# Patient Record
Sex: FEMALE | Race: WHITE | NOT HISPANIC OR LATINO | Employment: OTHER | URBAN - METROPOLITAN AREA
[De-identification: names, ages, dates, MRNs, and addresses within clinical notes are randomized per-mention and may not be internally consistent; named-entity substitution may affect disease eponyms.]

---

## 2023-05-26 ENCOUNTER — APPOINTMENT (OUTPATIENT)
Dept: PRIMARY CARE | Facility: CLINIC | Age: 73
End: 2023-05-26
Payer: MEDICARE

## 2023-06-12 ENCOUNTER — LAB (OUTPATIENT)
Dept: LAB | Facility: LAB | Age: 73
End: 2023-06-12
Payer: MEDICARE

## 2023-06-12 ENCOUNTER — OFFICE VISIT (OUTPATIENT)
Dept: PRIMARY CARE | Facility: CLINIC | Age: 73
End: 2023-06-12
Payer: MEDICARE

## 2023-06-12 VITALS
RESPIRATION RATE: 18 BRPM | TEMPERATURE: 97.6 F | DIASTOLIC BLOOD PRESSURE: 84 MMHG | BODY MASS INDEX: 29.18 KG/M2 | WEIGHT: 170 LBS | HEART RATE: 76 BPM | OXYGEN SATURATION: 97 % | SYSTOLIC BLOOD PRESSURE: 128 MMHG

## 2023-06-12 DIAGNOSIS — R19.7 DIARRHEA, UNSPECIFIED TYPE: ICD-10-CM

## 2023-06-12 DIAGNOSIS — R19.7 DIARRHEA, UNSPECIFIED TYPE: Primary | ICD-10-CM

## 2023-06-12 PROBLEM — H26.9 CORTICAL CATARACT OF BOTH EYES: Status: ACTIVE | Noted: 2019-11-06

## 2023-06-12 LAB
ALANINE AMINOTRANSFERASE (SGPT) (U/L) IN SER/PLAS: 13 U/L (ref 7–45)
ALBUMIN (G/DL) IN SER/PLAS: 4 G/DL (ref 3.4–5)
ALKALINE PHOSPHATASE (U/L) IN SER/PLAS: 87 U/L (ref 33–136)
ANION GAP IN SER/PLAS: 10 MMOL/L (ref 10–20)
ASPARTATE AMINOTRANSFERASE (SGOT) (U/L) IN SER/PLAS: 15 U/L (ref 9–39)
BILIRUBIN TOTAL (MG/DL) IN SER/PLAS: 1.2 MG/DL (ref 0–1.2)
CALCIUM (MG/DL) IN SER/PLAS: 9.1 MG/DL (ref 8.6–10.3)
CARBON DIOXIDE, TOTAL (MMOL/L) IN SER/PLAS: 30 MMOL/L (ref 21–32)
CHLORIDE (MMOL/L) IN SER/PLAS: 101 MMOL/L (ref 98–107)
CREATININE (MG/DL) IN SER/PLAS: 0.92 MG/DL (ref 0.5–1.05)
ERYTHROCYTE DISTRIBUTION WIDTH (RATIO) BY AUTOMATED COUNT: 13.2 % (ref 11.5–14.5)
ERYTHROCYTE MEAN CORPUSCULAR HEMOGLOBIN CONCENTRATION (G/DL) BY AUTOMATED: 32.5 G/DL (ref 32–36)
ERYTHROCYTE MEAN CORPUSCULAR VOLUME (FL) BY AUTOMATED COUNT: 88 FL (ref 80–100)
ERYTHROCYTES (10*6/UL) IN BLOOD BY AUTOMATED COUNT: 4.79 X10E12/L (ref 4–5.2)
GFR FEMALE: 65 ML/MIN/1.73M2
GLUCOSE (MG/DL) IN SER/PLAS: 116 MG/DL (ref 74–99)
HEMATOCRIT (%) IN BLOOD BY AUTOMATED COUNT: 42.2 % (ref 36–46)
HEMOGLOBIN (G/DL) IN BLOOD: 13.7 G/DL (ref 12–16)
LEUKOCYTES (10*3/UL) IN BLOOD BY AUTOMATED COUNT: 13.4 X10E9/L (ref 4.4–11.3)
PLATELETS (10*3/UL) IN BLOOD AUTOMATED COUNT: 206 X10E9/L (ref 150–450)
POTASSIUM (MMOL/L) IN SER/PLAS: 3.8 MMOL/L (ref 3.5–5.3)
PROTEIN TOTAL: 8.1 G/DL (ref 6.4–8.2)
SODIUM (MMOL/L) IN SER/PLAS: 137 MMOL/L (ref 136–145)
UREA NITROGEN (MG/DL) IN SER/PLAS: 18 MG/DL (ref 6–23)

## 2023-06-12 PROCEDURE — 87506 IADNA-DNA/RNA PROBE TQ 6-11: CPT

## 2023-06-12 PROCEDURE — 1036F TOBACCO NON-USER: CPT | Performed by: NURSE PRACTITIONER

## 2023-06-12 PROCEDURE — 1160F RVW MEDS BY RX/DR IN RCRD: CPT | Performed by: NURSE PRACTITIONER

## 2023-06-12 PROCEDURE — 36415 COLL VENOUS BLD VENIPUNCTURE: CPT

## 2023-06-12 PROCEDURE — 85027 COMPLETE CBC AUTOMATED: CPT

## 2023-06-12 PROCEDURE — 1159F MED LIST DOCD IN RCRD: CPT | Performed by: NURSE PRACTITIONER

## 2023-06-12 PROCEDURE — 80053 COMPREHEN METABOLIC PANEL: CPT

## 2023-06-12 PROCEDURE — 99213 OFFICE O/P EST LOW 20 MIN: CPT | Performed by: NURSE PRACTITIONER

## 2023-06-12 PROCEDURE — 1157F ADVNC CARE PLAN IN RCRD: CPT | Performed by: NURSE PRACTITIONER

## 2023-06-12 RX ORDER — AMLODIPINE BESYLATE 2.5 MG/1
2.5 TABLET ORAL
COMMUNITY
End: 2024-03-04 | Stop reason: SDUPTHER

## 2023-06-12 RX ORDER — CARVEDILOL 25 MG/1
25 TABLET ORAL
COMMUNITY
End: 2024-03-04 | Stop reason: SDUPTHER

## 2023-06-12 RX ORDER — ESOMEPRAZOLE MAGNESIUM 40 MG/1
CAPSULE, DELAYED RELEASE ORAL
COMMUNITY
End: 2023-10-09 | Stop reason: SDUPTHER

## 2023-06-12 RX ORDER — ERGOCALCIFEROL 1.25 MG/1
1 CAPSULE ORAL
COMMUNITY
Start: 2022-09-02 | End: 2023-09-07 | Stop reason: ALTCHOICE

## 2023-06-12 RX ORDER — LOSARTAN POTASSIUM 100 MG/1
1 TABLET ORAL DAILY
COMMUNITY
End: 2024-03-04 | Stop reason: SDUPTHER

## 2023-06-12 RX ORDER — CALCIUM CARBONATE 500(1250)
2 TABLET ORAL
COMMUNITY
End: 2023-09-07 | Stop reason: ALTCHOICE

## 2023-06-12 RX ORDER — OLOPATADINE HYDROCHLORIDE 1 MG/ML
SOLUTION/ DROPS OPHTHALMIC
COMMUNITY

## 2023-06-12 RX ORDER — OXYBUTYNIN CHLORIDE 10 MG/1
1 TABLET, EXTENDED RELEASE ORAL DAILY
COMMUNITY
Start: 2022-05-24 | End: 2024-03-04 | Stop reason: SDUPTHER

## 2023-06-12 RX ORDER — HYDROCHLOROTHIAZIDE 25 MG/1
25 TABLET ORAL DAILY
COMMUNITY
End: 2023-09-07 | Stop reason: ALTCHOICE

## 2023-06-12 ASSESSMENT — ENCOUNTER SYMPTOMS
DIARRHEA: 1
HEADACHES: 0
COUGH: 0
SLEEP DISTURBANCE: 1
CHILLS: 1
FLANK PAIN: 0
DIFFICULTY URINATING: 0
APPETITE CHANGE: 0
CONSTIPATION: 0
DYSURIA: 0
FATIGUE: 0
ACTIVITY CHANGE: 0
VOMITING: 1
NAUSEA: 0
BLOOD IN STOOL: 0
ABDOMINAL PAIN: 1
FEVER: 0

## 2023-06-12 NOTE — ASSESSMENT & PLAN NOTE
VS stable   Exam unremarkable  Stool studies, blood work and ultrasound ordered; Will follow up on results  Hydration encouraged along with BRAT/ low residue diet   If all testing is negative, will consider referral to GI  ER for any worsening of symptoms, pain or uncontrolled fevers

## 2023-06-12 NOTE — PROGRESS NOTES
Subjective   Patient ID: Teri Sandhu is a 73 y.o. female who presents for Diarrhea.    Here in December for Diarrhea; came and went  Immodium would help    Liquid stools x2 weeks  Vomited last night x1  Taking immodium non-stop; works maybe 6 hours  Light colored stool; looked like vanilla ice cream  R lower quadrant pain; not severe and intermittent  Chills; no fever; Just had vaccine for shingles  Had a BM 3x between 6a-7:30a  Eating and drinking ok. Eating exacerbates diarrhea  No new foods  No recent changes/ addition of medication  Denies any blood or mucous in stool  No recent antibiotic use  Weight stable  No recent travel              Diarrhea   This is a recurrent problem. The current episode started in the past 7 days. The problem has been unchanged. Associated symptoms include abdominal pain, chills and vomiting. Pertinent negatives include no coughing, fever or headaches. She has tried anti-motility drug for the symptoms.        Review of Systems   Constitutional:  Positive for chills. Negative for activity change, appetite change, fatigue and fever.   HENT:  Negative for congestion.    Respiratory:  Negative for cough.    Gastrointestinal:  Positive for abdominal pain, diarrhea and vomiting. Negative for blood in stool, constipation and nausea.   Genitourinary:  Negative for difficulty urinating, dysuria, flank pain and urgency.   Neurological:  Negative for headaches.   Psychiatric/Behavioral:  Positive for sleep disturbance.        Objective   /84   Pulse 76   Temp 36.4 °C (97.6 °F) (Temporal)   Resp 18   Wt 77.1 kg (170 lb)   SpO2 97%   BMI 29.18 kg/m²     Physical Exam  Vitals reviewed.   Constitutional:       Appearance: Normal appearance.   HENT:      Head: Normocephalic.      Nose: Nose normal.   Eyes:      Extraocular Movements: Extraocular movements intact.      Pupils: Pupils are equal, round, and reactive to light.   Cardiovascular:      Rate and Rhythm: Normal rate and  regular rhythm.      Pulses: Normal pulses.      Heart sounds: Normal heart sounds.   Pulmonary:      Effort: Pulmonary effort is normal.      Breath sounds: Normal breath sounds.   Abdominal:      General: Abdomen is flat. Bowel sounds are normal.      Palpations: Abdomen is soft.      Tenderness: There is no abdominal tenderness. There is no right CVA tenderness, left CVA tenderness or guarding.   Musculoskeletal:      Cervical back: Normal range of motion and neck supple.   Skin:     General: Skin is warm and dry.   Neurological:      General: No focal deficit present.      Mental Status: She is alert and oriented to person, place, and time.   Psychiatric:         Mood and Affect: Mood normal.         Behavior: Behavior normal.         Assessment/Plan   Problem List Items Addressed This Visit          Other    Diarrhea - Primary     VS stable   Exam unremarkable  Stool studies, blood work and ultrasound ordered; Will follow up on results  Hydration encouraged along with BRAT/ low residue diet   If all testing is negative, will consider referral to GI  ER for any worsening of symptoms, pain or uncontrolled fevers         Relevant Orders    Comprehensive Metabolic Panel    CBC    Stool Pathogen Panel, PCR    US abdomen    Ova/Para + Giardia/Cryptosporidium Antigen

## 2023-06-14 ENCOUNTER — APPOINTMENT (OUTPATIENT)
Dept: LAB | Facility: LAB | Age: 73
End: 2023-06-14
Payer: MEDICARE

## 2023-06-14 PROCEDURE — 87328 CRYPTOSPORIDIUM AG IA: CPT

## 2023-06-14 PROCEDURE — 87329 GIARDIA AG IA: CPT

## 2023-06-14 PROCEDURE — 87177 OVA AND PARASITES SMEARS: CPT

## 2023-06-15 LAB
CAMPYLOBACTER GP: NOT DETECTED
NOROVIRUS GI/GII: NOT DETECTED
ROTAVIRUS A: NOT DETECTED
SALMONELLA SP.: NOT DETECTED
SHIGA TOXIN 1: NOT DETECTED
SHIGA TOXIN 2: NOT DETECTED
SHIGELLA SP.: NOT DETECTED
VIBRIO GRP.: NOT DETECTED
YERSINIA ENTEROCOLITICA: NOT DETECTED

## 2023-06-19 ENCOUNTER — PATIENT MESSAGE (OUTPATIENT)
Dept: PRIMARY CARE | Facility: CLINIC | Age: 73
End: 2023-06-19
Payer: MEDICARE

## 2023-06-19 DIAGNOSIS — R19.7 DIARRHEA, UNSPECIFIED TYPE: Primary | ICD-10-CM

## 2023-06-19 LAB
CRYPTOSPORIDIUM ANTIGEN BY EIA: NEGATIVE
CRYPTOSPORIDIUM ANTIGEN-DATA CONVERSION: NEGATIVE
GIARDIA LAMBLIA AG-DATA CONVERSION: NEGATIVE
GIARDIA LAMBLIA AG: NEGATIVE
OVA + PARASITE EXAM: NEGATIVE

## 2023-08-31 PROBLEM — E87.6 DIURETIC-INDUCED HYPOKALEMIA: Status: ACTIVE | Noted: 2023-08-31

## 2023-08-31 PROBLEM — K76.0 FATTY LIVER: Status: ACTIVE | Noted: 2023-08-31

## 2023-08-31 PROBLEM — H25.13 NUCLEAR SCLEROSIS OF BOTH EYES: Status: ACTIVE | Noted: 2019-11-06

## 2023-08-31 PROBLEM — H00.012 HORDEOLUM EXTERNUM OF RIGHT LOWER EYELID: Status: ACTIVE | Noted: 2020-12-04

## 2023-08-31 PROBLEM — K22.2 GERD WITH STRICTURE: Status: ACTIVE | Noted: 2023-08-31

## 2023-08-31 PROBLEM — J30.2 SEASONAL ALLERGIC RHINITIS: Status: ACTIVE | Noted: 2023-08-31

## 2023-08-31 PROBLEM — H52.13 MYOPIA, BILATERAL: Status: ACTIVE | Noted: 2019-11-06

## 2023-08-31 PROBLEM — T50.2X5A DIURETIC-INDUCED HYPOKALEMIA: Status: ACTIVE | Noted: 2023-08-31

## 2023-08-31 PROBLEM — R32 URINARY INCONTINENCE: Status: ACTIVE | Noted: 2023-08-31

## 2023-08-31 PROBLEM — I49.3 PVC (PREMATURE VENTRICULAR CONTRACTION): Status: ACTIVE | Noted: 2023-08-31

## 2023-08-31 PROBLEM — R73.9 ELEVATED BLOOD SUGAR: Status: ACTIVE | Noted: 2023-08-31

## 2023-08-31 PROBLEM — R73.03 PREDIABETES: Status: ACTIVE | Noted: 2023-08-31

## 2023-08-31 PROBLEM — M85.80 OSTEOPENIA: Status: ACTIVE | Noted: 2023-08-31

## 2023-08-31 PROBLEM — H25.013 CORTICAL AGE-RELATED CATARACT, BILATERAL: Status: ACTIVE | Noted: 2018-08-27

## 2023-08-31 PROBLEM — H10.9 BACTERIAL CONJUNCTIVITIS: Status: ACTIVE | Noted: 2017-04-28

## 2023-08-31 PROBLEM — I10 HYPERTENSION, BENIGN: Status: ACTIVE | Noted: 2023-08-31

## 2023-08-31 PROBLEM — E55.9 VITAMIN D DEFICIENCY: Status: ACTIVE | Noted: 2023-08-31

## 2023-08-31 PROBLEM — K21.9 GERD WITH STRICTURE: Status: ACTIVE | Noted: 2023-08-31

## 2023-08-31 PROBLEM — M25.651 STIFFNESS OF RIGHT HIP JOINT: Status: ACTIVE | Noted: 2023-08-31

## 2023-08-31 RX ORDER — PROMETHAZINE HYDROCHLORIDE 25 MG/1
1 TABLET ORAL
COMMUNITY
Start: 2022-07-22 | End: 2023-09-07 | Stop reason: ALTCHOICE

## 2023-08-31 RX ORDER — SOLIFENACIN SUCCINATE 5 MG/1
TABLET, FILM COATED ORAL
COMMUNITY
End: 2024-03-04 | Stop reason: SDUPTHER

## 2023-08-31 RX ORDER — MISOPROSTOL 100 UG/1
1 TABLET ORAL
COMMUNITY
Start: 2022-09-28 | End: 2023-09-07 | Stop reason: ALTCHOICE

## 2023-08-31 RX ORDER — SOLIFENACIN SUCCINATE 10 MG/1
TABLET, FILM COATED ORAL
COMMUNITY
Start: 2020-08-15 | End: 2023-09-07 | Stop reason: ALTCHOICE

## 2023-08-31 RX ORDER — POTASSIUM CHLORIDE 750 MG/1
TABLET, FILM COATED, EXTENDED RELEASE ORAL
COMMUNITY
Start: 2017-05-14 | End: 2023-09-07 | Stop reason: ALTCHOICE

## 2023-08-31 RX ORDER — LORAZEPAM 0.5 MG/1
1 TABLET ORAL
COMMUNITY
Start: 2022-09-28 | End: 2023-09-07 | Stop reason: ALTCHOICE

## 2023-08-31 RX ORDER — FLUTICASONE PROPIONATE 50 MCG
SPRAY, SUSPENSION (ML) NASAL
COMMUNITY
End: 2023-09-07 | Stop reason: ALTCHOICE

## 2023-08-31 RX ORDER — DIPHENHYDRAMINE HCL 25 MG
TABLET ORAL
COMMUNITY
End: 2023-10-09 | Stop reason: ALTCHOICE

## 2023-08-31 RX ORDER — ESOMEPRAZOLE MAGNESIUM 40 MG/1
1 CAPSULE, DELAYED RELEASE ORAL DAILY PRN
COMMUNITY
End: 2024-03-04 | Stop reason: SDUPTHER

## 2023-08-31 RX ORDER — PHENYLPROPANOLAMINE/CLEMASTINE 75-1.34MG
TABLET, EXTENDED RELEASE ORAL
COMMUNITY

## 2023-08-31 RX ORDER — LISINOPRIL 10 MG/1
TABLET ORAL
COMMUNITY
End: 2023-09-07 | Stop reason: ALTCHOICE

## 2023-08-31 RX ORDER — HYDROCHLOROTHIAZIDE 12.5 MG/1
1 TABLET ORAL DAILY
COMMUNITY
Start: 2020-11-01 | End: 2024-03-04 | Stop reason: SDUPTHER

## 2023-09-07 ENCOUNTER — OFFICE VISIT (OUTPATIENT)
Dept: PRIMARY CARE | Facility: CLINIC | Age: 73
End: 2023-09-07
Payer: MEDICARE

## 2023-09-07 VITALS
TEMPERATURE: 98.1 F | HEART RATE: 75 BPM | DIASTOLIC BLOOD PRESSURE: 67 MMHG | HEIGHT: 64 IN | BODY MASS INDEX: 28.94 KG/M2 | SYSTOLIC BLOOD PRESSURE: 123 MMHG | WEIGHT: 169.5 LBS

## 2023-09-07 DIAGNOSIS — Z00.00 ROUTINE GENERAL MEDICAL EXAMINATION AT HEALTH CARE FACILITY: Primary | ICD-10-CM

## 2023-09-07 DIAGNOSIS — R73.9 ELEVATED BLOOD SUGAR: ICD-10-CM

## 2023-09-07 DIAGNOSIS — K76.0 FATTY LIVER: ICD-10-CM

## 2023-09-07 DIAGNOSIS — I10 HYPERTENSION, BENIGN: ICD-10-CM

## 2023-09-07 PROCEDURE — 1160F RVW MEDS BY RX/DR IN RCRD: CPT | Performed by: NURSE PRACTITIONER

## 2023-09-07 PROCEDURE — 99213 OFFICE O/P EST LOW 20 MIN: CPT | Performed by: NURSE PRACTITIONER

## 2023-09-07 PROCEDURE — 1170F FXNL STATUS ASSESSED: CPT | Performed by: NURSE PRACTITIONER

## 2023-09-07 PROCEDURE — 1159F MED LIST DOCD IN RCRD: CPT | Performed by: NURSE PRACTITIONER

## 2023-09-07 PROCEDURE — 1157F ADVNC CARE PLAN IN RCRD: CPT | Performed by: NURSE PRACTITIONER

## 2023-09-07 PROCEDURE — 3078F DIAST BP <80 MM HG: CPT | Performed by: NURSE PRACTITIONER

## 2023-09-07 PROCEDURE — 1036F TOBACCO NON-USER: CPT | Performed by: NURSE PRACTITIONER

## 2023-09-07 PROCEDURE — 3074F SYST BP LT 130 MM HG: CPT | Performed by: NURSE PRACTITIONER

## 2023-09-07 ASSESSMENT — ENCOUNTER SYMPTOMS
SHORTNESS OF BREATH: 0
DIARRHEA: 0
WEAKNESS: 0
VOMITING: 0
FEVER: 0
AGITATION: 0
CONSTIPATION: 0
NAUSEA: 0
SLEEP DISTURBANCE: 0
COUGH: 0
NERVOUS/ANXIOUS: 0
FATIGUE: 0

## 2023-09-07 ASSESSMENT — PATIENT HEALTH QUESTIONNAIRE - PHQ9
2. FEELING DOWN, DEPRESSED OR HOPELESS: NOT AT ALL
SUM OF ALL RESPONSES TO PHQ9 QUESTIONS 1 AND 2: 0
1. LITTLE INTEREST OR PLEASURE IN DOING THINGS: NOT AT ALL

## 2023-09-07 ASSESSMENT — ACTIVITIES OF DAILY LIVING (ADL)
DRESSING: INDEPENDENT
MANAGING_FINANCES: INDEPENDENT
DOING_HOUSEWORK: INDEPENDENT
TAKING_MEDICATION: INDEPENDENT
GROCERY_SHOPPING: INDEPENDENT
BATHING: INDEPENDENT

## 2023-09-07 NOTE — PROGRESS NOTES
"Subjective   Reason for Visit: Teri Sandhu is an 73 y.o. female here for a Medicare Wellness visit.          Reviewed all medications by prescribing practitioner or clinical pharmacist (such as prescriptions, OTCs, herbal therapies and supplements) and documented in the medical record.    She is feeling well and has no current complaints, she has a follow up with GI and had a recent scan of her abdomen done.         Patient Care Team:  LIZABETH Ramirez as PCP - General (Internal Medicine)  LIZABETH Urias as PCP - [facility] Attributed Provider     Review of Systems   Constitutional:  Negative for fatigue and fever.   Respiratory:  Negative for cough and shortness of breath.    Cardiovascular:  Negative for chest pain and leg swelling.   Gastrointestinal:  Negative for constipation, diarrhea, nausea and vomiting.   Skin:  Negative for pallor and rash.   Neurological:  Negative for weakness.   Psychiatric/Behavioral:  Negative for agitation, behavioral problems and sleep disturbance. The patient is not nervous/anxious.        Objective   Vitals:  /67   Pulse 75   Temp 36.7 °C (98.1 °F)   Ht 1.626 m (5' 4\")   Wt 76.9 kg (169 lb 8 oz)   BMI 29.09 kg/m²       Physical Exam  Vitals and nursing note reviewed.   Constitutional:       General: She is not in acute distress.  HENT:      Head: Normocephalic and atraumatic.   Eyes:      Pupils: Pupils are equal, round, and reactive to light.   Cardiovascular:      Rate and Rhythm: Normal rate and regular rhythm.   Pulmonary:      Effort: Pulmonary effort is normal.      Breath sounds: Normal breath sounds.   Abdominal:      General: Bowel sounds are normal.      Palpations: Abdomen is soft.   Skin:     General: Skin is warm and dry.   Neurological:      Mental Status: She is alert.   Psychiatric:         Mood and Affect: Mood normal.         Assessment/Plan   Problem List Items Addressed This Visit       Elevated blood sugar    Fatty liver    " Hypertension, benign     Other Visit Diagnoses       Routine general medical examination at health care facility    -  Primary    Relevant Orders    Comprehensive Metabolic Panel    Hemoglobin A1C    TSH with reflex to Free T4 if abnormal    Lipid Panel         She will let us know what medications she needs refilled  She will defer mammogram until after first of the year

## 2023-09-15 ENCOUNTER — LAB (OUTPATIENT)
Dept: LAB | Facility: LAB | Age: 73
End: 2023-09-15
Payer: MEDICARE

## 2023-09-15 DIAGNOSIS — Z00.00 ROUTINE GENERAL MEDICAL EXAMINATION AT HEALTH CARE FACILITY: ICD-10-CM

## 2023-09-15 LAB
ALANINE AMINOTRANSFERASE (SGPT) (U/L) IN SER/PLAS: 14 U/L (ref 7–45)
ALBUMIN (G/DL) IN SER/PLAS: 3.9 G/DL (ref 3.4–5)
ALKALINE PHOSPHATASE (U/L) IN SER/PLAS: 75 U/L (ref 33–136)
ANION GAP IN SER/PLAS: 11 MMOL/L (ref 10–20)
ASPARTATE AMINOTRANSFERASE (SGOT) (U/L) IN SER/PLAS: 15 U/L (ref 9–39)
BILIRUBIN TOTAL (MG/DL) IN SER/PLAS: 0.8 MG/DL (ref 0–1.2)
CALCIUM (MG/DL) IN SER/PLAS: 9 MG/DL (ref 8.6–10.3)
CARBON DIOXIDE, TOTAL (MMOL/L) IN SER/PLAS: 28 MMOL/L (ref 21–32)
CHLORIDE (MMOL/L) IN SER/PLAS: 104 MMOL/L (ref 98–107)
CHOLESTEROL (MG/DL) IN SER/PLAS: 157 MG/DL (ref 0–199)
CHOLESTEROL IN HDL (MG/DL) IN SER/PLAS: 49.1 MG/DL
CHOLESTEROL/HDL RATIO: 3.2
CREATININE (MG/DL) IN SER/PLAS: 0.84 MG/DL (ref 0.5–1.05)
ESTIMATED AVERAGE GLUCOSE FOR HBA1C: 117 MG/DL
GFR FEMALE: 72 ML/MIN/1.73M2
GLUCOSE (MG/DL) IN SER/PLAS: 109 MG/DL (ref 74–99)
HEMOGLOBIN A1C/HEMOGLOBIN TOTAL IN BLOOD: 5.7 %
LDL: 80 MG/DL (ref 0–99)
POTASSIUM (MMOL/L) IN SER/PLAS: 3.5 MMOL/L (ref 3.5–5.3)
PROTEIN TOTAL: 8 G/DL (ref 6.4–8.2)
SODIUM (MMOL/L) IN SER/PLAS: 139 MMOL/L (ref 136–145)
THYROTROPIN (MIU/L) IN SER/PLAS BY DETECTION LIMIT <= 0.05 MIU/L: 1.3 MIU/L (ref 0.44–3.98)
TRIGLYCERIDE (MG/DL) IN SER/PLAS: 140 MG/DL (ref 0–149)
UREA NITROGEN (MG/DL) IN SER/PLAS: 26 MG/DL (ref 6–23)
VLDL: 28 MG/DL (ref 0–40)

## 2023-09-15 PROCEDURE — 80061 LIPID PANEL: CPT

## 2023-09-15 PROCEDURE — 83036 HEMOGLOBIN GLYCOSYLATED A1C: CPT

## 2023-09-15 PROCEDURE — 80053 COMPREHEN METABOLIC PANEL: CPT

## 2023-09-15 PROCEDURE — 36415 COLL VENOUS BLD VENIPUNCTURE: CPT

## 2023-09-15 PROCEDURE — 84443 ASSAY THYROID STIM HORMONE: CPT

## 2023-10-09 ENCOUNTER — OFFICE VISIT (OUTPATIENT)
Dept: GASTROENTEROLOGY | Facility: CLINIC | Age: 73
End: 2023-10-09
Payer: MEDICARE

## 2023-10-09 VITALS
DIASTOLIC BLOOD PRESSURE: 81 MMHG | SYSTOLIC BLOOD PRESSURE: 148 MMHG | HEIGHT: 64 IN | WEIGHT: 169 LBS | BODY MASS INDEX: 28.85 KG/M2 | TEMPERATURE: 97.4 F | HEART RATE: 71 BPM

## 2023-10-09 DIAGNOSIS — K76.0 FATTY LIVER: Primary | ICD-10-CM

## 2023-10-09 PROCEDURE — 99212 OFFICE O/P EST SF 10 MIN: CPT | Performed by: NURSE PRACTITIONER

## 2023-10-09 PROCEDURE — 99212 OFFICE O/P EST SF 10 MIN: CPT | Mod: PO | Performed by: NURSE PRACTITIONER

## 2023-10-09 PROCEDURE — 3079F DIAST BP 80-89 MM HG: CPT | Performed by: NURSE PRACTITIONER

## 2023-10-09 PROCEDURE — 1159F MED LIST DOCD IN RCRD: CPT | Performed by: NURSE PRACTITIONER

## 2023-10-09 PROCEDURE — 1160F RVW MEDS BY RX/DR IN RCRD: CPT | Performed by: NURSE PRACTITIONER

## 2023-10-09 PROCEDURE — 3077F SYST BP >= 140 MM HG: CPT | Performed by: NURSE PRACTITIONER

## 2023-10-09 PROCEDURE — 1036F TOBACCO NON-USER: CPT | Performed by: NURSE PRACTITIONER

## 2023-10-09 ASSESSMENT — ENCOUNTER SYMPTOMS
APNEA: 0
NEUROLOGICAL NEGATIVE: 1
FATIGUE: 0
WHEEZING: 0
FEVER: 0
STRIDOR: 0
ALLERGIC/IMMUNOLOGIC NEGATIVE: 1
CHEST TIGHTNESS: 0
PSYCHIATRIC NEGATIVE: 1
SHORTNESS OF BREATH: 0
CHILLS: 0
RESPIRATORY NEGATIVE: 1
DIAPHORESIS: 0
ENDOCRINE NEGATIVE: 1
ROS GI COMMENTS: SEE HPI
MUSCULOSKELETAL NEGATIVE: 1
COUGH: 0
CARDIOVASCULAR NEGATIVE: 1
EYES NEGATIVE: 1
HEMATOLOGIC/LYMPHATIC NEGATIVE: 1
DIFFICULTY URINATING: 0

## 2023-10-09 NOTE — PROGRESS NOTES
History of Present Illness  Teri Sandhu is a 73 year old female with a PMH of GERD, PVCs      CT negative.e--fatty liver, drinks socially, no scleral icterus, abdominal swelling or leg swelling (mild at times), stool culture and O&P negative. No longer having diarrhea, was having diarrhea a few times/week.   She stopped dairy and no longer having diarrhea.    She denies nocturnal BMs, fevers, weight loss, rectal bleeding, nocturnal BMs.    She is a wine drinker socially    Colonoscopy 2020- TA-repeat in 5 years    Family Hx: She denies a family hx of CRC, GI cancers         Review of Systems   Constitutional:  Negative for chills, diaphoresis, fatigue and fever.   HENT: Negative.     Eyes: Negative.    Respiratory: Negative.  Negative for apnea, cough, chest tightness, shortness of breath, wheezing and stridor.    Cardiovascular: Negative.    Gastrointestinal:         See HPI    Endocrine: Negative.    Genitourinary: Negative.  Negative for difficulty urinating.   Musculoskeletal: Negative.    Skin: Negative.    Allergic/Immunologic: Negative.    Neurological: Negative.    Hematological: Negative.    Psychiatric/Behavioral: Negative.          Physical Exam  Constitutional:       Appearance: Normal appearance. She is normal weight.   HENT:      Nose: Nose normal.   Eyes:      General: Lids are normal.   Cardiovascular:      Rate and Rhythm: Normal rate and regular rhythm.      Heart sounds: Normal heart sounds.   Pulmonary:      Effort: Pulmonary effort is normal.      Breath sounds: Normal breath sounds.   Abdominal:      General: Bowel sounds are normal.   Musculoskeletal:         General: Normal range of motion.   Skin:     General: Skin is warm and dry.   Neurological:      Mental Status: She is alert and oriented to person, place, and time.   Psychiatric:         Mood and Affect: Mood normal.              This is a 73 year old female with a PMH of GERD, PVCs who presents today for chronic diarrhea.  Intermittent her entire life and more recently was more frequent. SHe was using Imodium and decided to try a lactose free diet and her diarrhea had resolved. She did have a CT which showed fatty liver, otherwise unremarkable for GI etiology. We reviewed fibroscan and discussed alcohol consumption, diet, exercise and weight loss.

## 2023-10-11 ENCOUNTER — HOSPITAL ENCOUNTER (EMERGENCY)
Facility: HOSPITAL | Age: 73
Discharge: HOME | End: 2023-10-11
Attending: EMERGENCY MEDICINE
Payer: MEDICARE

## 2023-10-11 ENCOUNTER — APPOINTMENT (OUTPATIENT)
Dept: RADIOLOGY | Facility: HOSPITAL | Age: 73
End: 2023-10-11
Payer: MEDICARE

## 2023-10-11 VITALS
WEIGHT: 169 LBS | BODY MASS INDEX: 27.16 KG/M2 | RESPIRATION RATE: 18 BRPM | SYSTOLIC BLOOD PRESSURE: 154 MMHG | HEIGHT: 66 IN | OXYGEN SATURATION: 97 % | DIASTOLIC BLOOD PRESSURE: 80 MMHG | TEMPERATURE: 96.8 F | HEART RATE: 93 BPM

## 2023-10-11 DIAGNOSIS — S42.91XA CLOSED FRACTURE OF RIGHT SHOULDER, INITIAL ENCOUNTER: Primary | ICD-10-CM

## 2023-10-11 PROCEDURE — 99285 EMERGENCY DEPT VISIT HI MDM: CPT | Performed by: EMERGENCY MEDICINE

## 2023-10-11 PROCEDURE — 73030 X-RAY EXAM OF SHOULDER: CPT | Mod: RT,FY

## 2023-10-11 PROCEDURE — 72125 CT NECK SPINE W/O DYE: CPT | Performed by: RADIOLOGY

## 2023-10-11 PROCEDURE — 73030 X-RAY EXAM OF SHOULDER: CPT | Mod: RIGHT SIDE | Performed by: RADIOLOGY

## 2023-10-11 PROCEDURE — 70450 CT HEAD/BRAIN W/O DYE: CPT | Mod: MG

## 2023-10-11 PROCEDURE — 72125 CT NECK SPINE W/O DYE: CPT

## 2023-10-11 PROCEDURE — 70450 CT HEAD/BRAIN W/O DYE: CPT | Performed by: RADIOLOGY

## 2023-10-11 PROCEDURE — 99285 EMERGENCY DEPT VISIT HI MDM: CPT | Mod: 25

## 2023-10-11 RX ORDER — ACETAMINOPHEN 325 MG/1
650 TABLET ORAL ONCE
Status: COMPLETED | OUTPATIENT
Start: 2023-10-11 | End: 2023-10-11

## 2023-10-11 RX ORDER — HYDROCODONE BITARTRATE AND ACETAMINOPHEN 5; 325 MG/1; MG/1
1 TABLET ORAL EVERY 6 HOURS PRN
Qty: 12 TABLET | Refills: 0 | Status: SHIPPED | OUTPATIENT
Start: 2023-10-11

## 2023-10-11 RX ORDER — IBUPROFEN 600 MG/1
600 TABLET ORAL ONCE
Status: DISCONTINUED | OUTPATIENT
Start: 2023-10-11 | End: 2023-10-11

## 2023-10-11 RX ADMIN — ACETAMINOPHEN 650 MG: 325 TABLET ORAL at 18:06

## 2023-10-11 ASSESSMENT — PAIN DESCRIPTION - LOCATION: LOCATION: SHOULDER

## 2023-10-11 ASSESSMENT — PAIN DESCRIPTION - ONSET: ONSET: SUDDEN

## 2023-10-11 ASSESSMENT — PAIN SCALES - GENERAL
PAINLEVEL_OUTOF10: 0 - NO PAIN
PAINLEVEL_OUTOF10: 9
PAINLEVEL_OUTOF10: 9
PAINLEVEL_OUTOF10: 0 - NO PAIN

## 2023-10-11 ASSESSMENT — COLUMBIA-SUICIDE SEVERITY RATING SCALE - C-SSRS
2. HAVE YOU ACTUALLY HAD ANY THOUGHTS OF KILLING YOURSELF?: NO
6. HAVE YOU EVER DONE ANYTHING, STARTED TO DO ANYTHING, OR PREPARED TO DO ANYTHING TO END YOUR LIFE?: NO

## 2023-10-11 ASSESSMENT — LIFESTYLE VARIABLES
HAVE PEOPLE ANNOYED YOU BY CRITICIZING YOUR DRINKING: NO
REASON UNABLE TO ASSESS: YES
EVER HAD A DRINK FIRST THING IN THE MORNING TO STEADY YOUR NERVES TO GET RID OF A HANGOVER: NO
HAVE YOU EVER FELT YOU SHOULD CUT DOWN ON YOUR DRINKING: NO
EVER FELT BAD OR GUILTY ABOUT YOUR DRINKING: NO

## 2023-10-11 ASSESSMENT — PAIN DESCRIPTION - PROGRESSION: CLINICAL_PROGRESSION: NOT CHANGED

## 2023-10-11 ASSESSMENT — PAIN DESCRIPTION - ORIENTATION: ORIENTATION: RIGHT

## 2023-10-11 ASSESSMENT — PAIN DESCRIPTION - FREQUENCY: FREQUENCY: CONSTANT/CONTINUOUS

## 2023-10-11 ASSESSMENT — PAIN DESCRIPTION - PAIN TYPE: TYPE: ACUTE PAIN

## 2023-10-11 ASSESSMENT — PAIN DESCRIPTION - DESCRIPTORS: DESCRIPTORS: SHARP

## 2023-10-11 ASSESSMENT — PAIN - FUNCTIONAL ASSESSMENT: PAIN_FUNCTIONAL_ASSESSMENT: 0-10

## 2023-10-11 NOTE — ED TRIAGE NOTES
"Pt states,\" I fall in [employer] parking lot landed on right shoulder, now having sharp pain & limited movement\".  "

## 2023-10-11 NOTE — ED PROVIDER NOTES
"HPI   Chief Complaint   Patient presents with    Fall     Pt states,\" I fall in [employer] parking lot landed on right shoulder, now having sharp pain & limited movement\".       Patient is a 73-year-old female with hypertension, GERD who presents emergency after mechanical fall.  She was in the parking lot at [employer] when she fell.  She states that she tripped and did not have any symptoms such as chest pain, shortness of breath, dizziness prior to the fall.  She land on the right side, hurting her shoulder.  Denies pain and will also just the ribs, hip, leg, head, neck.  Denies hitting her head.  Does not have any nausea, vomiting, loss of consciousness.  She denies any numbness or weakness in her extremities or her right arm.  She has limited range of motion of her right arm.  That she drove home first to change before coming to the emergency department.  She is not on any blood thinners.                          Leland Coma Scale Score: 15                  Patient History   Past Medical History:   Diagnosis Date    Allergic     Arthritis     Cataract     Encounter for screening for malignant neoplasm of colon     Colon cancer screening    Encounter for screening for malignant neoplasm of vagina     Vaginal Pap smear    Hypertension     Personal history of other medical treatment 11/18/2019    History of mammogram    Personal history of other medical treatment     History of bone density study    Visual impairment      Past Surgical History:   Procedure Laterality Date    FRACTURE SURGERY      OTHER SURGICAL HISTORY  11/18/2019    Dilation and curettage    OTHER SURGICAL HISTORY  11/18/2019    Tonsillectomy with adenoidectomy    TONSILLECTOMY       Family History   Problem Relation Name Age of Onset    Hypertension Mother Jazmine Gabriel     Hyperlipidemia Mother Jazmine Gabriel     Other (cva) Mother Jazmine Gabriel     Dementia Mother Jazmine Gabriel     Arthritis Mother Jazmine Gabriel     Cancer Mother Jazmine Gabriel     " Stroke Mother Jazmine Gabriel     Diabetes Father Dorian Gabriel     Dementia Father Dorian Gabriel     Alzheimer's disease Father Dorian Gabriel     Arthritis Father Dorian Gabriel     Hearing loss Father Dorian Gabriel     Heart disease Father Dorian Gabriel     Cancer Sister Dorothy Wiley     Ovarian cancer Sister Dorothy Wiley      Social History     Tobacco Use    Smoking status: Former     Packs/day: 0.50     Years: 10.00     Additional pack years: 0.00     Total pack years: 5.00     Types: Cigarettes    Smokeless tobacco: Never   Substance Use Topics    Alcohol use: Yes     Alcohol/week: 4.0 standard drinks of alcohol     Types: 2 Glasses of wine, 2 Standard drinks or equivalent per week     Comment: Many weeks none, some weks more    Drug use: Never       Physical Exam   ED Triage Vitals [10/11/23 1523]   Temp Heart Rate Resp BP   36 °C (96.8 °F) 74 16 180/81      SpO2 Temp Source Heart Rate Source Patient Position   99 % Tympanic Monitor Sitting      BP Location FiO2 (%)     Left arm --       Physical Exam  Vitals and nursing note reviewed.   Constitutional:       General: She is not in acute distress.     Appearance: She is well-developed.   HENT:      Head: Normocephalic and atraumatic.      Right Ear: External ear normal.      Left Ear: External ear normal.      Nose: Nose normal.   Eyes:      General: No scleral icterus.     Conjunctiva/sclera: Conjunctivae normal.      Pupils: Pupils are equal, round, and reactive to light.   Cardiovascular:      Rate and Rhythm: Normal rate and regular rhythm.      Heart sounds: No murmur heard.  Pulmonary:      Effort: Pulmonary effort is normal. No respiratory distress.      Breath sounds: Normal breath sounds.   Abdominal:      Palpations: Abdomen is soft.      Tenderness: There is no abdominal tenderness. There is no guarding or rebound.   Musculoskeletal:         General: Tenderness, deformity and signs of injury present. No swelling.      Cervical back: Neck supple.       Comments: Tenderness of the right shoulder with limited range of mobility.  Shoulder appears to be anteriorly placed compared to the left.  No tenderness of the remainder of the arm, other extremities, back or neck.   Skin:     General: Skin is warm and dry.      Capillary Refill: Capillary refill takes less than 2 seconds.   Neurological:      General: No focal deficit present.      Mental Status: She is alert and oriented to person, place, and time.      Cranial Nerves: No cranial nerve deficit.      Sensory: No sensory deficit.      Motor: No weakness.   Psychiatric:         Mood and Affect: Mood normal.         ED Course & MDM   Diagnoses as of 10/12/23 0015   Closed fracture of right shoulder, initial encounter       Medical Decision Making  Patient is a 73-year-old female who presents to the emergency department after mechanical fall landing on her right shoulder.  She is not on any blood thinners.  On arrival, she is afebrile helically stable.  No acute distress.  No focal neurologic deficits.  She did not have any head injury.  No loss of consciousness, nausea or vomiting.  Examination of her right upper extremity shows that it is in a abducted position at the shoulder and flex at the elbow.  She has limited range of motion due to pain.  She is neurovascularly intact in the right upper extremity.  X-ray of the right shoulder is obtained.  Given her elderly age and distracting injury, CT of the head and C-spine also obtained.    CT cervical spine wo IV contrast   Final Result    No evidence of acute fracture of the cervical spine.          Multilevel degenerative change of the cervical spine.          MACRO:    None          Signed by: Edouard Wills 10/11/2023 7:31 PM    Dictation workstation:   [id number]     CT head wo IV contrast   Final Result    No evidence of acute intracranial hemorrhage or depressed calvarial    fracture.                      MACRO:    None          Signed by: Edouard Wills  10/11/2023 7:29 PM    Dictation workstation:   [id number]     XR shoulder right 2+ views   Final Result    Mildly displaced fracture through the greater tuberosity with    probable extension to the humeral neck representing a comminuted    proximal humeral fracture. CT can be performed for confirmation.                MACRO:    None          Signed by: Chase Dongsopiha 10/11/2023 6:48 PM    Dictation workstation:   [id number]      Findings were discussed with the patient.  She is placed in a sling and swath.  She is given orthopedic follow-up.  Home care and return instructions discussed. Patient expressed understanding and agreement. Patient discharged in stable condition.    Tony Sánchez DO, PGY-3  Emergency Medicine Resident          =================Attending note===============    The patient was seen by the resident/fellow.  I have personally performed a substantive portion of the encounter.  I have seen and examined the patient; agree with the workup, evaluation, MDM,   management and diagnosis.  The care plan has been discussed with the resident; I have reviewed the resident's note and agree with the documented findings.      This is a 73 y.o. female who presents to ER with right shoulder pain.  She had a mechanical trip and fall at the parking lot of [employer].  She denies anticoagulants patient has loss of consciousness.  She denies head injury.  She does have some right-sided neck pain off the midline.  No hip or lower extremity pain.  No left arm injury.  No nausea or vomiting.  There is no dizziness.  Is a mechanical fall.  Head is atraumatic.  There is some right paraspinal tenderness.  There is some fullness of the anterior right shoulder.  She prefers to hold the arm in abduction.  Radial ulnar pulses intact.  Cap refill brisk.  Sensation intact.  No pain in the distal arm.  No pain at the elbow or the wrist.    CT imaging and x-ray are ordered.  Patient was preferring to drive home.  Therefore she is  given Tylenol.  She is aware that if there is a dislocation we may need to give her sedation.            ==========================================      Amount and/or Complexity of Data Reviewed  Radiology: ordered and independent interpretation performed.        Procedure  Procedures     Tony Sánchez DO  Resident  10/12/23 0016

## 2023-10-12 ENCOUNTER — OFFICE VISIT (OUTPATIENT)
Dept: ORTHOPEDIC SURGERY | Facility: CLINIC | Age: 73
End: 2023-10-12
Payer: MEDICARE

## 2023-10-12 VITALS — WEIGHT: 169 LBS | BODY MASS INDEX: 27.16 KG/M2 | HEIGHT: 66 IN

## 2023-10-12 DIAGNOSIS — E55.9 VITAMIN D DEFICIENCY, UNSPECIFIED: ICD-10-CM

## 2023-10-12 DIAGNOSIS — S42.254A NONDISPLACED FRACTURE OF GREATER TUBEROSITY OF RIGHT HUMERUS, INITIAL ENCOUNTER FOR CLOSED FRACTURE: Primary | ICD-10-CM

## 2023-10-12 PROCEDURE — 1159F MED LIST DOCD IN RCRD: CPT | Performed by: FAMILY MEDICINE

## 2023-10-12 PROCEDURE — 1036F TOBACCO NON-USER: CPT | Performed by: FAMILY MEDICINE

## 2023-10-12 PROCEDURE — 99203 OFFICE O/P NEW LOW 30 MIN: CPT | Performed by: FAMILY MEDICINE

## 2023-10-12 PROCEDURE — 99213 OFFICE O/P EST LOW 20 MIN: CPT | Mod: 57,PO | Performed by: FAMILY MEDICINE

## 2023-10-12 PROCEDURE — 23600 CLTX PROX HUMRL FX W/O MNPJ: CPT | Performed by: FAMILY MEDICINE

## 2023-10-12 PROCEDURE — 1126F AMNT PAIN NOTED NONE PRSNT: CPT | Performed by: FAMILY MEDICINE

## 2023-10-12 PROCEDURE — L3670 SO ACRO/CLAV CAN WEB PRE OTS: HCPCS | Performed by: FAMILY MEDICINE

## 2023-10-12 PROCEDURE — 1160F RVW MEDS BY RX/DR IN RCRD: CPT | Performed by: FAMILY MEDICINE

## 2023-10-12 PROCEDURE — 23600 CLTX PROX HUMRL FX W/O MNPJ: CPT | Mod: PO | Performed by: FAMILY MEDICINE

## 2023-10-12 ASSESSMENT — PAIN SCALES - GENERAL: PAINLEVEL_OUTOF10: 0 - NO PAIN

## 2023-10-12 NOTE — DISCHARGE INSTRUCTIONS
Seek immediate medical attention if you develop: increasing pain, numbness, tingling, weakness, loss of motion in your arm or shoulder, your arm becomes pale or cold, or you develop any new or worsening symptoms.

## 2023-10-13 NOTE — PROGRESS NOTES
Acute Injury New Patient Visit    CC:   Chief Complaint   Patient presents with    Right Shoulder - Pain     Fall yesterday, Rt Shoulder Injury, Xrays @ [facility]       HPI: Teri is a 73 y.o.female who presents today with new complaints of right shoulder pain status post fall.  [place] [facility] [place] diagnosed with a proximal humerus fracture.  She presents here today for further evaluation.  She has a history of osteoporosis and known vitamin D deficiency.  She presents here today for further evaluation.        Review of Systems   GENERAL: Negative for malaise, significant weight loss, fever  MUSCULOSKELETAL: See HPI  NEURO: Negative for numbness / tingling     Past Medical History  Past Medical History:   Diagnosis Date    Allergic     Arthritis     Cataract     Encounter for screening for malignant neoplasm of colon     Colon cancer screening    Encounter for screening for malignant neoplasm of vagina     Vaginal Pap smear    Hypertension     Personal history of other medical treatment 2019    History of mammogram    Personal history of other medical treatment     History of bone density study    Visual impairment        Medication review  Medication Documentation Review Audit       Reviewed by Miguel Cedeño (Technologist) on 10/12/23 at 1059      Medication Order Taking? Sig Documenting Provider Last Dose Status   acetaminophen (Tylenol) tablet 650 mg [id number]   Tony Sánchez, DO   10/11/23 1806   amLODIPine (Norvasc) 2.5 mg tablet [id number] No Take 1 tablet (2.5 mg) by mouth once daily. Historical Provider, MD Taking Active   CALCIUM ORAL [id number] No Take by mouth. Historical Provider, MD Taking Active   calcium polycarbophiL (Fibercon) 625 mg tablet [id number]  Take by mouth once daily. Historical Provider, MD  Active   carvedilol (Coreg) 25 mg tablet [id number] No Take 1 tablet (25 mg) by mouth 2 times a day with meals. Historical Provider, MD Taking Active    Discontinued 10/09/23 1412   Discontinued 10/09/23 1147   esomeprazole (NexIUM) 40 mg DR capsule [id number] No Take 1 capsule (40 mg) by mouth once daily as needed. Historical Provider, MD Taking Active   hydroCHLOROthiazide (HYDRODiuril) 12.5 mg tablet [id number] No Take 1 tablet (12.5 mg) by mouth once daily. Historical Provider, MD Taking Active   HYDROcodone-acetaminophen (Norco) 5-325 mg tablet [id number]  Take 1 tablet by mouth every 6 hours if needed for severe pain (7 - 10). Logan Kimbrough, DO  Active   ibuprofen (Motrin) capsule [id number] No  Historical Provider, MD Taking Active      Discontinued 10/11/23 1800   loratadine 10 mg capsule [id number] No Take 1 capsule by mouth once daily. Historical Provider, MD Taking Active   losartan (Cozaar) 100 mg tablet [id number] No Take 1 tablet (100 mg) by mouth once daily. Historical Provider, MD Taking Active   multivit-min/ferrous fumarate (MULTI VITAMIN ORAL) [id number] No Take by mouth. For seniors Historical Provider, MD Taking Active   olopatadine (Pataday Twice Daily Relief) 0.1 % ophthalmic solution [id number] No Administer into affected eye(s). Historical Provider, MD Taking Active   oxybutynin XL (Ditropan-XL) 10 mg 24 hr tablet [id number] No Take 1 tablet (10 mg) by mouth once daily. Historical Provider, MD Taking Active   solifenacin (Vesicare) 5 mg tablet [id number] No  Historical Provider, MD Not Taking Active                    Allergies  Allergies   Allergen Reactions    Erythromycin Diarrhea    Erythromycin Base Unknown       Social History  Social History     Socioeconomic History    Marital status:      Spouse name: Not on file    Number of children: Not on file    Years of education: Not on file    Highest education level: Not on file   Occupational History    Not on file   Tobacco Use    Smoking status: Former     Packs/day: 0.50     Years: 10.00     Additional pack years: 0.00     Total pack years: 5.00     Types: Cigarettes    Smokeless tobacco:  Never   Substance and Sexual Activity    Alcohol use: Yes     Alcohol/week: 4.0 standard drinks of alcohol     Types: 2 Glasses of wine, 2 Standard drinks or equivalent per week     Comment: Many weeks none, some weks more    Drug use: Never    Sexual activity: Not Currently     Partners: Male     Birth control/protection: Post-menopausal   Other Topics Concern    Not on file   Social History Narrative    Not on file     Social Determinants of Health     Financial Resource Strain: Not on file   Food Insecurity: Not on file   Transportation Needs: Not on file   Physical Activity: Not on file   Stress: Not on file   Social Connections: Not on file   Intimate Partner Violence: Not on file   Housing Stability: Not on file       Surgical History  Past Surgical History:   Procedure Laterality Date    FRACTURE SURGERY      OTHER SURGICAL HISTORY  11/18/2019    Dilation and curettage    OTHER SURGICAL HISTORY  11/18/2019    Tonsillectomy with adenoidectomy    TONSILLECTOMY         Physical Exam:  GENERAL:  Patient is awake, alert, and oriented to person place and time.  Patient appears well nourished and well kept.  Affect Calm, Not Acutely Distressed.  HEENT:  Normocephalic, Atraumatic, EOMI  CARDIOVASCULAR:  Hemodynamically stable.  RESPIRATORY:  Normal respirations with unlabored breathing.  NEURO: Gross sensation intact to the upper extremities bilaterally.  Extremity: Right shoulder exam demonstrate soft tissue swelling and bruising with mild global tenderness circumferentially about the proximal humerus.  The elbow is nontender forearm compartment soft compressible  strength equal symmetric and intact.  Range of motion and strength testing deferred secondary to injury.  No tenderness palpation at the distal clavicle mid clavicle or head or neck region.  She has good wrist flexion and extension.      Diagnostics: Previous outside [facility] at [facility] were reviewed  CT cervical spine wo IV contrast  Narrative:  Interpreted By:  Edouard Wills,   STUDY:  CT CERVICAL SPINE WO IV CONTRAST;  10/11/2023 6:51 pm      INDICATION:  Signs/Symptoms:Fall.      COMPARISON:  None.      ACCESSION NUMBER(S):  [id number]      ORDERING CLINICIAN:  TYLER DEUTSCH      TECHNIQUE:  Contiguous axial images of the cervical spine were obtained without  intravenous contrast. Coronal and sagittal reformatted images were  obtained from the axial images.      FINDINGS:  The examination is limited secondary to motion. No evidence of acute  fracture of the cervical spine. There is multilevel degenerative  change of the cervical spine. There is multilevel intervertebral disc  space narrowing and anterior osseous spurring. There is limited  evaluation of the soft tissue of the spinal canal. There is  multilevel posterior osseous spurring and disc protrusion. There is  mild degenerative facet and uncovertebral arthropathy. No significant  prevertebral soft tissue edema.      Impression: No evidence of acute fracture of the cervical spine.      Multilevel degenerative change of the cervical spine.      MACRO:  None      Signed by: Edouard Wills 10/11/2023 7:31 PM  Dictation workstation:   [id number]  CT head wo IV contrast  Narrative: Interpreted By:  Edouard Wills,   STUDY:  CT HEAD WO IV CONTRAST;  10/11/2023 6:51 pm      INDICATION:  Fall.      COMPARISON:  None      ACCESSION NUMBER(S):  [id number]      ORDERING CLINICIAN:  TYLER DEUTSCH      TECHNIQUE:  Contiguous axial images of the head were obtained without intravenous  contrast.      FINDINGS:  BRAIN PARENCHYMA:   The gray white matter differentiation is  preserved.  No mass effect or midline shift.      HEMORRHAGE:  No evidence of acute intracranial hemorrhage.  VENTRICLES AND EXTRA-AXIAL SPACES:  The ventricles are within normal  limits in size for brain volume.  No evidence of abnormal extraaxial  fluid collection. EXTRACRANIAL SOFT TISSUES:  Within normal limits.  PARANASAL SINUSES/MASTOIDS:   The visualized paranasal sinuses and  mastoid air cells are clear and well pneumatized. CALVARIUM:  No  evidence of depressed calvarial fracture.      OTHER FINDINGS:  None      Impression: No evidence of acute intracranial hemorrhage or depressed calvarial  fracture.              MACRO:  None      Signed by: Edouard Wills 10/11/2023 7:29 PM  Dictation workstation:   [id number]  XR shoulder right 2+ views  Narrative: Interpreted By:  Chase Melo,   STUDY:  XR SHOULDER RIGHT 2+ VIEWS; ;  10/11/2023 6:40 pm      INDICATION:  Signs/Symptoms:Fall onto right shoulder, pain.      COMPARISON:  None.      ACCESSION NUMBER(S):  [id number]      ORDERING CLINICIAN:  TYLER DEUTSCH      FINDINGS:  Right shoulder, three views      There is cortical disruption with mild displacement of the greater  tuberosity with linear sclerosis through the humeral neck. There is  no dislocation. Moderate degenerative change of the AC and  glenohumeral joints.      Impression: Mildly displaced fracture through the greater tuberosity with  probable extension to the humeral neck representing a comminuted  proximal humeral fracture. CT can be performed for confirmation.          MACRO:  None      Signed by: Chase Melo 10/11/2023 6:48 PM  Dictation workstation:   [id number]          Procedure: None  Procedures    Assessment:   Problem List Items Addressed This Visit    None  Visit Diagnoses       Nondisplaced fracture of greater tuberosity of right humerus, initial encounter for closed fracture    -  Primary    Relevant Orders    Vitamin D 25-Hydroxy,Total (for eval of Vitamin D levels)    Sling    Vitamin D deficiency, unspecified        Relevant Orders    Vitamin D 25-Hydroxy,Total (for eval of Vitamin D levels)             Plan: Discussed the nonoperative nature of the injury with the patient is agreeable and willing to accept.  We will offer her vitamin D level here today.  She was also provided with a more comfortable sling.  We  will see her back in approximately 4 weeks for repeat evaluation, repeat x-rays 4 views of the right shoulder.  She denied the need for any pain medications or muscle relaxers.  Orders Placed This Encounter    Sling    Vitamin D 25-Hydroxy,Total (for eval of Vitamin D levels)      At the conclusion of the visit there were no further questions by the patient/family regarding their plan of care.  Patient was instructed to call or return with any issues, questions, or concerns regarding their injury and/or treatment plan described above.     10/13/23 at 12:54 PM - Cole C Budinsky, MD    Office: [phone]    This note was prepared using voice recognition software.  The details of this note are correct and have been reviewed, and corrected to the best of my ability.  Some grammatical errors may persist related to the Dragon software.

## 2023-10-26 ENCOUNTER — LAB (OUTPATIENT)
Dept: LAB | Facility: LAB | Age: 73
End: 2023-10-26
Payer: MEDICARE

## 2023-10-26 DIAGNOSIS — E55.9 VITAMIN D DEFICIENCY, UNSPECIFIED: ICD-10-CM

## 2023-10-26 DIAGNOSIS — S42.254A NONDISPLACED FRACTURE OF GREATER TUBEROSITY OF RIGHT HUMERUS, INITIAL ENCOUNTER FOR CLOSED FRACTURE: ICD-10-CM

## 2023-10-26 LAB — 25(OH)D3 SERPL-MCNC: 31 NG/ML (ref 30–100)

## 2023-10-26 PROCEDURE — 36415 COLL VENOUS BLD VENIPUNCTURE: CPT

## 2023-10-26 PROCEDURE — 82306 VITAMIN D 25 HYDROXY: CPT

## 2023-11-08 ENCOUNTER — ANCILLARY PROCEDURE (OUTPATIENT)
Dept: RADIOLOGY | Facility: CLINIC | Age: 73
End: 2023-11-08
Payer: MEDICARE

## 2023-11-08 ENCOUNTER — OFFICE VISIT (OUTPATIENT)
Dept: ORTHOPEDIC SURGERY | Facility: CLINIC | Age: 73
End: 2023-11-08
Payer: MEDICARE

## 2023-11-08 DIAGNOSIS — S42.254A NONDISPLACED FRACTURE OF GREATER TUBEROSITY OF RIGHT HUMERUS, INITIAL ENCOUNTER FOR CLOSED FRACTURE: Primary | ICD-10-CM

## 2023-11-08 DIAGNOSIS — S42.254A NONDISPLACED FRACTURE OF GREATER TUBEROSITY OF RIGHT HUMERUS, INITIAL ENCOUNTER FOR CLOSED FRACTURE: ICD-10-CM

## 2023-11-08 DIAGNOSIS — S42.211A FX HUMERAL NECK, RIGHT, CLOSED, INITIAL ENCOUNTER: ICD-10-CM

## 2023-11-08 PROCEDURE — 73030 X-RAY EXAM OF SHOULDER: CPT | Mod: RT

## 2023-11-08 PROCEDURE — 1126F AMNT PAIN NOTED NONE PRSNT: CPT | Performed by: FAMILY MEDICINE

## 2023-11-08 PROCEDURE — 99024 POSTOP FOLLOW-UP VISIT: CPT | Performed by: FAMILY MEDICINE

## 2023-11-08 PROCEDURE — 73030 X-RAY EXAM OF SHOULDER: CPT | Mod: RIGHT SIDE | Performed by: FAMILY MEDICINE

## 2023-11-08 PROCEDURE — 3077F SYST BP >= 140 MM HG: CPT | Performed by: FAMILY MEDICINE

## 2023-11-08 PROCEDURE — 1160F RVW MEDS BY RX/DR IN RCRD: CPT | Performed by: FAMILY MEDICINE

## 2023-11-08 PROCEDURE — 3079F DIAST BP 80-89 MM HG: CPT | Performed by: FAMILY MEDICINE

## 2023-11-08 PROCEDURE — 1159F MED LIST DOCD IN RCRD: CPT | Performed by: FAMILY MEDICINE

## 2023-11-08 PROCEDURE — 1036F TOBACCO NON-USER: CPT | Performed by: FAMILY MEDICINE

## 2023-11-08 NOTE — PROGRESS NOTES
Established Patient Follow-Up Visit    CC:   Chief Complaint   Patient presents with    Right Shoulder - Follow-up     Nondisplaced fracture of greater tuberosity of right humerus, initial encounter for closed fracture       HPI:  Teri is a 73 y.o. female returns here today for follow-up visit regarding: Right shoulder pain, proximal humerus fracture and greater tuberosity fracture.  She presents here today 3 weeks out from her injury.  She has significant improvements in respect to her range of motion and strength as well as decreased pain.  She denies any new issue or concern.          REVIEW OF SYSTEMS:  GENERAL: Negative for malaise, significant weight loss, fever  MUSCULOSKELETAL: See HPI  NEURO: Denies for numbness / tingling       PHYSICAL EXAM:  -Neuro: Gross sensation intact to the upper extremities bilaterally.  -Extremity: Right shoulder exam demonstrates mild discomfort with increased active and passive range of motion no significant tenderness palpation circumferentially about the shoulder.  Forward flexion is limited to 50 degrees lateral abduction to 60 degrees internal rotation to the side hip pocket external rotation to the earlobe.  Form compartment soft compressible.   strength equal symmetric and intact.    IMAGING: Repeat x-rays today as discussed with patient below  XR shoulder right 2+ views  Interpreted By:  Budinsky, Cole,   STUDY:  XR SHOULDER RIGHT 2+ VIEWS;  ;  11/8/2023 9:14 am      INDICATION:  Signs/Symptoms:pain.      ACCESSION NUMBER(S):  [id number]      ORDERING CLINICIAN:  COLE BUDINSKY      FINDINGS:  Repeat right shoulder films demonstrate stable appearing minimally  comminuted proximal humerus fracture and greater tuberosity  fractures. No presence for additional fracture of the clavicle.  Moderate osteoarthritic changes most pronounced at the AC joint and  glenohumeral joint. Question small effusion. Overall impression  interval healing with callus formation to the  minimally comminuted  nondisplaced proximal humeral head and neck fracture with small  nondisplaced greater tuberosity fracture.          Signed by: Cole Budinsky 11/8/2023 9:47 AM  Dictation workstation:   [id number]        Patient also reviewed and discussed her vitamin D level, she will continue with her daily 5000 supplement.  PROCEDURE: None  Procedures     ASSESSMENT:   Follow-up visit for:  Problem List Items Addressed This Visit    None  Visit Diagnoses       Nondisplaced fracture of greater tuberosity of right humerus, initial encounter for closed fracture    -  Primary    Relevant Orders    XR shoulder right 2+ views (Completed)    Referral to Physical Therapy    Fx humeral neck, right, closed, initial encounter        Relevant Orders    Referral to Physical Therapy             PLAN: At this time we will offer the patient a physical therapy proximal humerus fracture rehab protocol.  We discussed the need to initiate range of motion and strength activities as tolerated per protocols.  We will see her back in 4 weeks for repeat evaluation, repeat x-rays 4 views right shoulder.  Continue with icing Tylenol for pain control as well as her vitamin D supplement.  She will continue to wean from the sling as able and tolerated with out activities, she will utilize the sling for ambulation and protection only going forward.  Orders Placed This Encounter    XR shoulder right 2+ views    Referral to Physical Therapy           At the conclusion of the visit there were no further questions by the patient/family regarding their plan of care.  Patient was instructed to call or return with any issues, questions, or concerns regarding their injury and/or treatment plan described above.     11/08/23 at 9:55 AM - Cole C Budinsky, MD    Office: [phone]    This note was prepared using voice recognition software.  The details of this note are correct and have been reviewed, and corrected to the best of my ability.   Some grammatical errors may persist related to the Dragon software.

## 2023-11-15 ENCOUNTER — EVALUATION (OUTPATIENT)
Dept: PHYSICAL THERAPY | Facility: CLINIC | Age: 73
End: 2023-11-15
Payer: MEDICARE

## 2023-11-15 DIAGNOSIS — M25.511 ACUTE PAIN OF RIGHT SHOULDER: Primary | ICD-10-CM

## 2023-11-15 DIAGNOSIS — S42.211A FX HUMERAL NECK, RIGHT, CLOSED, INITIAL ENCOUNTER: ICD-10-CM

## 2023-11-15 DIAGNOSIS — S42.254A NONDISPLACED FRACTURE OF GREATER TUBEROSITY OF RIGHT HUMERUS, INITIAL ENCOUNTER FOR CLOSED FRACTURE: ICD-10-CM

## 2023-11-15 PROCEDURE — 97110 THERAPEUTIC EXERCISES: CPT | Mod: GP | Performed by: PHYSICAL THERAPIST

## 2023-11-15 PROCEDURE — 97161 PT EVAL LOW COMPLEX 20 MIN: CPT | Mod: GP | Performed by: PHYSICAL THERAPIST

## 2023-11-15 PROCEDURE — 97140 MANUAL THERAPY 1/> REGIONS: CPT | Mod: GP | Performed by: PHYSICAL THERAPIST

## 2023-11-15 ASSESSMENT — PAIN - FUNCTIONAL ASSESSMENT: PAIN_FUNCTIONAL_ASSESSMENT: 0-10

## 2023-11-15 ASSESSMENT — PAIN SCALES - GENERAL: PAINLEVEL_OUTOF10: 0 - NO PAIN

## 2023-11-15 NOTE — PROGRESS NOTES
Physical Therapy Evaluation    Patient Name: Teri Sandhu  MRN: [id number]  Today's Date: 11/15/2023       Visit #: 1  Visits approved: Medicare.  Certification dates: 11/15/23-24    Precautions:  Precautions  Precautions Comment: Low fall risk, Osteoporosis.    Subjective/History    Quick DASH: 56.82 at eval.  TU sec w/o assistive device.    DOI: 10/11/23.  Mechanism of injury: Tripped and fell to right shoulder- prox humeral fx.Intermittent dull pain at lat > med right arm.  Aggravating factors: Comb hair. Reach up. Don shirt. Reach behind back for dressing.   Easing factors: Tylenol.  Red flags: None.  Occupation: Retired.  Recreation/Hobbies/Sports: Walking.  Living situation: 2-story home- does stairs safely with rail.  Barriers to treatment: None.  Preferred language: English.    Objective Findings  Observation/gait: Gait wnl.   TU sec w/o assistive device.  AROM shoulder flex: ~80- pain, stiffness. Abd: ~75- pain, stiffness. ER: ~60- pain, stiffness. Hbb: ~SIJ- pain, stiffness. Left Hbb: ~T9.  AROM elbow flex and ext wnl.  PROM GH flex: ~100- stiff. Abd: ~85- stiff. ER: ~70- stiff.   Muscle activation/Resisted testing:  strength wnl.   Sensation: intact to LT distally.    Treatment:   IV-- GH flex, abd, caud in abd// PROM flex: ~110. Abd: ~90.  Therex: AAROM shoulder flex in supine- HEP 20x, 3x/day. AROM ER- HEP 10x, 6x/day. Scap pinch- HEP 10x, 6x/day.    Assessment  Patient presents with decr ROM consistent with healing right humeral fx. Would benefit from PT to incr ROM and restore post-injury function.    Plan  Physical therapy 1-2 times/week for 8-10 wks. Therapy may include the following: Therapeutic exercise, manual therapy, education/home program.     Goals: By discharge: Comb hair w/o pain. Reach up into high cupboard w/o pain. Don shirt w/o pain. Reach behind back for dressing w/o pain.  Short-term: 6 wks- AROM shoulder flex to 150.

## 2023-11-15 NOTE — LETTER
November 15, 2023    Dorian Hernandez, PT  [address] [facility] [address]  [facility] [address]    Patient: Teri Sandhu   YOB: 1950   Date of Visit: 11/15/2023       Dear Cole C Budinsky, Md  [address]  [facility], [address] [place] [address],  [place] [address]    The attached plan of care is being sent to you because your patient’s medical reimbursement requires that you certify the plan of care. Your signature is required to allow uninterrupted insurance coverage.      You may indicate your approval by signing below and faxing this form back to us at Dept Fax: [phone].    Please call Dept: [phone] with any questions or concerns.    Thank you for this referral,        Dorian Hernandez PT  [facility] [address] [facility]  [address] [facility] [address]    Payer: Payor: MEDICARE / Plan: MEDICARE PART A AND B / Product Type: *No Product type* /                                                                         Date:     Dear Dorian Hernandez PT,     Re: Ms. Teri Sandhu, MRN:[id number]    I certify that I have reviewed the attached plan of care and it is medically necessary for Ms. Teri Sandhu (1950) who is under my care.          ______________________________________                    _________________  Provider name and credentials                                           Date and time                                                                                           Plan of Care 11/15/23   Effective from: 11/15/2023  Effective to: 2/5/2024    Plan ID: [id number]            Participants as of Finalize on 11/15/2023    Name Type Comments Contact Info    Cole C Budinsky, MD Referring Provider please sign for Medicare [phone]    Dorian Hernandez PT Physical Therapist  [phone]       Last Plan Note     Author: Dorain Hernandez PT Status: Incomplete Last edited: 11/15/2023  5:15 PM       Physical Therapy Evaluation    Patient  Name: Teri Sandhu  MRN: [id number]  Today's Date: 11/15/2023       Visit #: 1  Visits approved: Medicare.  Certification dates: 11/15/23-24    Precautions:  Precautions  Precautions Comment: Low fall risk, Osteoporosis.    Subjective/History    Quick DASH: 56.82 at eval.  TU sec w/o assistive device.    DOI: 10/11/23.  Mechanism of injury: Tripped and fell to right shoulder- prox humeral fx.Intermittent dull pain at lat > med right arm.  Aggravating factors: Comb hair. Reach up. Don shirt. Reach behind back for dressing.   Easing factors: Tylenol.  Red flags: None.  Occupation: Retired.  Recreation/Hobbies/Sports: Walking.  Living situation: 2-story home- does stairs safely with rail.  Barriers to treatment: None.  Preferred language: English.    Objective Findings  Observation/gait: Gait wnl.   TU sec w/o assistive device.  AROM shoulder flex: ~80- pain, stiffness. Abd: ~75- pain, stiffness. ER: ~60- pain, stiffness. Hbb: ~SIJ- pain, stiffness. Left Hbb: ~T9.  AROM elbow flex and ext wnl.  PROM GH flex: ~100- stiff. Abd: ~85- stiff. ER: ~70- stiff.   Muscle activation/Resisted testing:  strength wnl.   Sensation: intact to LT distally.    Treatment:   IV-- GH flex, abd, caud in abd// PROM flex: ~110. Abd: ~90.  Therex: AAROM shoulder flex in supine- HEP 20x, 3x/day. AROM ER- HEP 10x, 6x/day. Scap pinch- HEP 10x, 6x/day.    Assessment  Patient presents with decr ROM consistent with healing right humeral fx. Would benefit from PT to incr ROM and restore post-injury function.    Plan  Physical therapy 1-2 times/week for 8-10 wks. Therapy may include the following: Therapeutic exercise, manual therapy, education/home program.     Goals: By discharge: Comb hair w/o pain. Reach up into high cupboard w/o pain. Don shirt w/o pain. Reach behind back for dressing w/o pain.  Short-term: 6 wks- AROM shoulder flex to 150.          Current Participants as of 11/15/2023    Name Type Comments Contact Info     Cole C Budinsky, MD Referring Provider please sign for Medicare [phone]    Signature pending    Dorian Hernandez PT Physical Therapist  [phone]    Signature pending

## 2023-11-22 ENCOUNTER — TREATMENT (OUTPATIENT)
Dept: PHYSICAL THERAPY | Facility: CLINIC | Age: 73
End: 2023-11-22
Payer: MEDICARE

## 2023-11-22 DIAGNOSIS — M25.511 ACUTE PAIN OF RIGHT SHOULDER: Primary | ICD-10-CM

## 2023-11-22 PROCEDURE — 97140 MANUAL THERAPY 1/> REGIONS: CPT | Mod: GP | Performed by: PHYSICAL THERAPIST

## 2023-11-22 PROCEDURE — 97110 THERAPEUTIC EXERCISES: CPT | Mod: GP | Performed by: PHYSICAL THERAPIST

## 2023-11-22 NOTE — PROGRESS NOTES
Physical Therapy Follow-up    Patient Name: Teri Sandhu  MRN: [id number]  Today's Date: 11/22/2023         Visit#: 2. Medicare.  Cert dates: 11/15/23-2/5/24    Subjective: Decr pain. Easier to reach. HEP going well.    Objective: AROM shoulder flex: ~110- stiff. Abd: ~90- stiff. ER: ~50- stiff.    Treatment:   Therapeutic exercise: AAROM shoulder flex in supine, AROM ER, scap pinch- all demo'd correctly. Ball rolling up wall into flex- HEP 15x, 2x/day.   Manual therapy: III-- GH flex, abd, ER, caud in abd// AROM flex: ~115. Abd: ~100.     Assessment: Incr ROM with rx. Demo's HEP correctly.    Plan: Incr self mobs, scap stabilization exer.

## 2023-11-29 ENCOUNTER — TREATMENT (OUTPATIENT)
Dept: PHYSICAL THERAPY | Facility: CLINIC | Age: 73
End: 2023-11-29
Payer: MEDICARE

## 2023-11-29 DIAGNOSIS — M25.511 ACUTE PAIN OF RIGHT SHOULDER: Primary | ICD-10-CM

## 2023-11-29 PROCEDURE — 97140 MANUAL THERAPY 1/> REGIONS: CPT | Mod: CQ,GP

## 2023-11-29 PROCEDURE — 97110 THERAPEUTIC EXERCISES: CPT | Mod: GP,CQ

## 2023-11-29 ASSESSMENT — PAIN - FUNCTIONAL ASSESSMENT: PAIN_FUNCTIONAL_ASSESSMENT: 0-10

## 2023-11-29 ASSESSMENT — PAIN SCALES - GENERAL: PAINLEVEL_OUTOF10: 2

## 2023-11-29 NOTE — PROGRESS NOTES
"      Physical Therapy Treatment    Patient Name: Teri Sandhu  MRN: [id number]  Today's Date: 11/29/2023  Time Calculation  Start Time: 1134  Stop Time: 1220  Time Calculation (min): 46 min  Insurance   Visit#: 3. Medicare.  Cert dates: 11/15/23-2/5/24  Current Problem:  1. Acute pain of right shoulder            Subjective:  I feel good, I have some soreness when I lay on my R side  Pain:  Pain Assessment: 0-10  Pain Score: 2 (when I lay on it)    Objective:  AROM R shoulder  Flexion 110  Abd 106  HIB L5  ER 65  Precautions  Precautions  Precautions Comment: Low fall risk  Treatments:  Therapeutic Exercise 01066: Unit(s)-2  Supine AAROM into flexion with contralateral UE assistance x 10 x 10\" hold  Supine AROM Flexion x`10 R UE  SL R shoulder ER x10  SL R shoulder Abd x 10  Standing  AAROM with Wand   Flexion, Abd, Scaption x 10  Manual Therapy 16316: Unit(s)-1   PROM- R shoulder all planes x 15min   Assessment: Able to perform exercises without increase of symptoms.  Able to demonstrate good technique with wand exercises.  Plan: Continue to work to progress AROM and strength as able  "

## 2023-12-05 ENCOUNTER — TREATMENT (OUTPATIENT)
Dept: PHYSICAL THERAPY | Facility: CLINIC | Age: 73
End: 2023-12-05
Payer: MEDICARE

## 2023-12-05 DIAGNOSIS — M25.511 ACUTE PAIN OF RIGHT SHOULDER: Primary | ICD-10-CM

## 2023-12-05 PROCEDURE — 97110 THERAPEUTIC EXERCISES: CPT | Mod: GP | Performed by: PHYSICAL THERAPIST

## 2023-12-05 PROCEDURE — 97140 MANUAL THERAPY 1/> REGIONS: CPT | Mod: GP | Performed by: PHYSICAL THERAPIST

## 2023-12-05 NOTE — PROGRESS NOTES
Physical Therapy Follow-up    Patient Name: Teri Sandhu  MRN: [id number]  Today's Date: 12/5/2023         Visit#: 4. Medicare.  Cert dates: 11/15/23-2/5/24    Subjective: No resting pain. Easier to reach. HEP going well.    Objective: AROM shoulder flex: ~130- stiff. Abd: ~100- stiff. ER: ~60- stiff.    Treatment:   Therapeutic exercise: AAROM shoulder flex in supine, AROM ER, scap pinch, ball rolling up wall into flex- all demo'd correctly. Tband row- HEP 30x, 2x/day.   Manual therapy: III-- GH flex, abd, ER, caud in abd// AROM flex: ~140. Abd: ~110.     Assessment: Incr ROM with rx. Demo's HEP correctly.    Plan: Incr self mobs, scap stabilization exer.

## 2023-12-13 ENCOUNTER — ANCILLARY PROCEDURE (OUTPATIENT)
Dept: RADIOLOGY | Facility: CLINIC | Age: 73
End: 2023-12-13
Payer: MEDICARE

## 2023-12-13 ENCOUNTER — TREATMENT (OUTPATIENT)
Dept: PHYSICAL THERAPY | Facility: CLINIC | Age: 73
End: 2023-12-13
Payer: MEDICARE

## 2023-12-13 ENCOUNTER — OFFICE VISIT (OUTPATIENT)
Dept: ORTHOPEDIC SURGERY | Facility: CLINIC | Age: 73
End: 2023-12-13
Payer: MEDICARE

## 2023-12-13 DIAGNOSIS — M25.511 RIGHT SHOULDER PAIN, UNSPECIFIED CHRONICITY: ICD-10-CM

## 2023-12-13 DIAGNOSIS — M25.511 ACUTE PAIN OF RIGHT SHOULDER: Primary | ICD-10-CM

## 2023-12-13 PROCEDURE — 73030 X-RAY EXAM OF SHOULDER: CPT | Mod: RT,FY

## 2023-12-13 PROCEDURE — 97110 THERAPEUTIC EXERCISES: CPT | Mod: GP | Performed by: PHYSICAL THERAPIST

## 2023-12-13 PROCEDURE — 1125F AMNT PAIN NOTED PAIN PRSNT: CPT | Performed by: FAMILY MEDICINE

## 2023-12-13 PROCEDURE — 1160F RVW MEDS BY RX/DR IN RCRD: CPT | Performed by: FAMILY MEDICINE

## 2023-12-13 PROCEDURE — 1159F MED LIST DOCD IN RCRD: CPT | Performed by: FAMILY MEDICINE

## 2023-12-13 PROCEDURE — 73030 X-RAY EXAM OF SHOULDER: CPT | Mod: RIGHT SIDE | Performed by: FAMILY MEDICINE

## 2023-12-13 PROCEDURE — 97140 MANUAL THERAPY 1/> REGIONS: CPT | Mod: GP | Performed by: PHYSICAL THERAPIST

## 2023-12-13 PROCEDURE — 1036F TOBACCO NON-USER: CPT | Performed by: FAMILY MEDICINE

## 2023-12-13 PROCEDURE — 99024 POSTOP FOLLOW-UP VISIT: CPT | Performed by: FAMILY MEDICINE

## 2023-12-13 NOTE — PROGRESS NOTES
Established Patient Follow-Up Visit    CC:   Chief Complaint   Patient presents with    Right Shoulder - Follow-up     Nondisplaced fracture of greater tuberosity of right humerus  Xrays today       HPI:  Teri is a 73 y.o. female returns here today for follow-up visit regarding: Nondisplaced right greater tuberosity fracture.  She states she is doing very well has excellent range of motion and strength she can perform her activities of daily living without any issue.  She presents here today for follow-up has 2 or 3 more physical therapy visits left before she has out of town for the winter months.          REVIEW OF SYSTEMS:  GENERAL: Negative for malaise, significant weight loss, fever  MUSCULOSKELETAL: See HPI  NEURO: Negative for numbness / tingling       PHYSICAL EXAM:  -Neuro: Gross sensation intact to the upper extremities bilaterally.  -Extremity: Left shoulder exam scratch at right shoulder exam demonstrates no crepitus no tenderness to palpation she has excellent improvements in range of motion 135 degrees, range of forward flexion lateral abduction to 90 normal internal and external rotation equal and symmetric bilaterally.  5 out of 5 strength with resisted supraspinatus internal and external rotation  strength equal and symmetric.    IMAGING: Repeat x-rays today demonstrate stable interval healing proximal humerus fracture and greater tuberosity fracture.      PROCEDURE: None  Procedures     ASSESSMENT:   Follow-up visit for:  Problem List Items Addressed This Visit       Right shoulder pain    Relevant Orders    XR shoulder right 2+ views        PLAN: At this time we will have patient wean out her last several visits with physical therapy, she will continue with her home exercises going forward we will plan on visiting with her as needed going forward.  She will be heading out of town in a few weeks for several months if there is any worsening issues or concerns upon her return in the spring we  will see her back we discussed possibility of providing her with a steroid injection if necessary for pain inside the shoulder.  Orders Placed This Encounter    XR shoulder right 2+ views           At the conclusion of the visit there were no further questions by the patient/family regarding their plan of care.  Patient was instructed to call or return with any issues, questions, or concerns regarding their injury and/or treatment plan described above.     12/13/23 at 10:30 AM - Cole C Budinsky, MD    Office: [phone]    This note was prepared using voice recognition software.  The details of this note are correct and have been reviewed, and corrected to the best of my ability.  Some grammatical errors may persist related to the Dragon software.

## 2023-12-13 NOTE — PROGRESS NOTES
Physical Therapy Follow-up    Patient Name: Teri Sandhu  MRN: [id number]  Today's Date: 12/13/2023         Visit#: 5. Medicare.  Cert dates: 11/15/23-2/5/24    Subjective: No resting pain. Easier to reach. HEP going well.    Objective: AROM shoulder flex: ~130- stiff. Abd: ~105- stiff. ER: ~65- stiff.    Treatment:   Therapeutic exercise: AAROM shoulder flex in supine, AROM ER, scap pinch, ball rolling up wall into flex, Tband row- all demo'd correctly. Sidelying ER- HEP 20x, 2x/day.   Manual therapy: III-- GH flex, abd, ER, caud in abd// AROM flex: ~140. Abd: ~110.     Assessment: Incr ROM with rx. Demo's HEP correctly.    Plan: Incr self mobs, scap stabilization exer.

## 2023-12-19 ENCOUNTER — TREATMENT (OUTPATIENT)
Dept: PHYSICAL THERAPY | Facility: CLINIC | Age: 73
End: 2023-12-19
Payer: MEDICARE

## 2023-12-19 DIAGNOSIS — M25.511 ACUTE PAIN OF RIGHT SHOULDER: Primary | ICD-10-CM

## 2023-12-19 PROCEDURE — 97140 MANUAL THERAPY 1/> REGIONS: CPT | Mod: GP | Performed by: PHYSICAL THERAPIST

## 2023-12-19 PROCEDURE — 97110 THERAPEUTIC EXERCISES: CPT | Mod: GP | Performed by: PHYSICAL THERAPIST

## 2023-12-19 NOTE — PROGRESS NOTES
Physical Therapy Follow-up    Patient Name: Teri Sandhu  MRN: [id number]  Today's Date: 12/19/2023       Visit#: 6. Medicare.  Cert dates: 11/15/23-2/5/24    Subjective: No resting pain. Easier to reach. HEP going well.    Objective: AROM shoulder flex: ~130- stiff. Abd: ~110- stiff. ER: ~65- stiff.    Treatment:   Therapeutic exercise: AAROM shoulder flex in supine, AROM ER, scap pinch, ball rolling up wall into flex, Tband row- all demo'd correctly. Tband min bilat ER- HEP 20x, 2x/day.   Manual therapy: III-- GH flex, abd, ER, caud in abd// AROM flex: ~140. Abd: ~115.     Assessment: Incr ROM with rx. Demo's HEP correctly.    Plan: Incr self mobs, scap stabilization exer.

## 2023-12-29 ENCOUNTER — TREATMENT (OUTPATIENT)
Dept: PHYSICAL THERAPY | Facility: CLINIC | Age: 73
End: 2023-12-29
Payer: MEDICARE

## 2023-12-29 DIAGNOSIS — M25.511 ACUTE PAIN OF RIGHT SHOULDER: ICD-10-CM

## 2023-12-29 PROCEDURE — 97140 MANUAL THERAPY 1/> REGIONS: CPT | Mod: GP | Performed by: PHYSICAL THERAPIST

## 2023-12-29 PROCEDURE — 97110 THERAPEUTIC EXERCISES: CPT | Mod: GP | Performed by: PHYSICAL THERAPIST

## 2023-12-29 NOTE — PROGRESS NOTES
Physical Therapy Follow-up    Patient Name: Teri Sandhu  MRN: [id number]  Today's Date: 12/29/2023       Visit#: 7. Medicare.  Cert dates: 11/15/23-2/5/24    Subjective: No resting pain. Easier to reach. HEP going well. Leaving for winter in [facility] next week. Thinks she is ready for self management. Able to reach up into high cupboard with min pain. Combing hair, donning shirt and dressing with Hbb w/o pain.    Objective: AROM shoulder flex: ~140- stiff. Abd: ~120- stiff. ER: ~65- stiff.    Treatment:   Therapeutic exercise: AAROM shoulder flex in supine, AROM ER, scap pinch, ball rolling up wall into flex, Tband row Tband mini bilat ER- all demo'd correctly.    Manual therapy: III-- GH flex, abd, ER, caud in abd// AROM flex: ~150.     Assessment: All goals nearly achieved. Demo's HEP correctly and ready for self management.    Plan: Discharge with HEP.

## 2024-03-04 DIAGNOSIS — K22.2 GERD WITH STRICTURE: ICD-10-CM

## 2024-03-04 DIAGNOSIS — K21.9 GERD WITH STRICTURE: ICD-10-CM

## 2024-03-04 DIAGNOSIS — I10 HYPERTENSION, BENIGN: Primary | ICD-10-CM

## 2024-03-04 DIAGNOSIS — R32 URINARY INCONTINENCE, UNSPECIFIED TYPE: ICD-10-CM

## 2024-03-04 NOTE — TELEPHONE ENCOUNTER
Former [facility] patient  She lm for refills on all her meds to mail order    She last seen you 9/2023  No future appts scheduled with you

## 2024-03-05 RX ORDER — OXYBUTYNIN CHLORIDE 10 MG/1
10 TABLET, EXTENDED RELEASE ORAL DAILY
Qty: 90 TABLET | Refills: 1 | Status: SHIPPED | OUTPATIENT
Start: 2024-03-05

## 2024-03-05 RX ORDER — LOSARTAN POTASSIUM 100 MG/1
100 TABLET ORAL DAILY
Qty: 90 TABLET | Refills: 1 | Status: SHIPPED | OUTPATIENT
Start: 2024-03-05

## 2024-03-05 RX ORDER — SOLIFENACIN SUCCINATE 5 MG/1
TABLET, FILM COATED ORAL
Qty: 90 TABLET | Refills: 1 | Status: SHIPPED | OUTPATIENT
Start: 2024-03-05

## 2024-03-05 RX ORDER — AMLODIPINE BESYLATE 2.5 MG/1
2.5 TABLET ORAL
Qty: 90 TABLET | Refills: 1 | Status: SHIPPED | OUTPATIENT
Start: 2024-03-05

## 2024-03-05 RX ORDER — ESOMEPRAZOLE MAGNESIUM 40 MG/1
40 CAPSULE, DELAYED RELEASE ORAL DAILY PRN
Qty: 90 CAPSULE | Refills: 1 | Status: SHIPPED | OUTPATIENT
Start: 2024-03-05

## 2024-03-05 RX ORDER — CARVEDILOL 25 MG/1
25 TABLET ORAL
Qty: 180 TABLET | Refills: 1 | Status: SHIPPED | OUTPATIENT
Start: 2024-03-05

## 2024-03-05 RX ORDER — HYDROCHLOROTHIAZIDE 12.5 MG/1
12.5 TABLET ORAL DAILY
Qty: 90 TABLET | Refills: 1 | Status: SHIPPED | OUTPATIENT
Start: 2024-03-05

## 2024-09-25 ENCOUNTER — APPOINTMENT (OUTPATIENT)
Dept: PRIMARY CARE | Facility: CLINIC | Age: 74
End: 2024-09-25
Payer: MEDICARE

## 2024-09-25 VITALS
SYSTOLIC BLOOD PRESSURE: 102 MMHG | HEART RATE: 70 BPM | WEIGHT: 158.38 LBS | HEIGHT: 66 IN | DIASTOLIC BLOOD PRESSURE: 68 MMHG | BODY MASS INDEX: 25.45 KG/M2 | TEMPERATURE: 97.7 F

## 2024-09-25 DIAGNOSIS — I10 HYPERTENSION, BENIGN: ICD-10-CM

## 2024-09-25 DIAGNOSIS — K76.0 FATTY LIVER: ICD-10-CM

## 2024-09-25 DIAGNOSIS — I49.3 PVC (PREMATURE VENTRICULAR CONTRACTION): ICD-10-CM

## 2024-09-25 DIAGNOSIS — Z00.00 ROUTINE GENERAL MEDICAL EXAMINATION AT HEALTH CARE FACILITY: Primary | ICD-10-CM

## 2024-09-25 DIAGNOSIS — R73.9 ELEVATED BLOOD SUGAR: ICD-10-CM

## 2024-09-25 DIAGNOSIS — E55.9 VITAMIN D DEFICIENCY: ICD-10-CM

## 2024-09-25 DIAGNOSIS — Z78.0 MENOPAUSE PRESENT: ICD-10-CM

## 2024-09-25 PROCEDURE — 1123F ACP DISCUSS/DSCN MKR DOCD: CPT | Performed by: NURSE PRACTITIONER

## 2024-09-25 PROCEDURE — 3074F SYST BP LT 130 MM HG: CPT | Performed by: NURSE PRACTITIONER

## 2024-09-25 PROCEDURE — 1170F FXNL STATUS ASSESSED: CPT | Performed by: NURSE PRACTITIONER

## 2024-09-25 PROCEDURE — 1159F MED LIST DOCD IN RCRD: CPT | Performed by: NURSE PRACTITIONER

## 2024-09-25 PROCEDURE — 3078F DIAST BP <80 MM HG: CPT | Performed by: NURSE PRACTITIONER

## 2024-09-25 PROCEDURE — G0439 PPPS, SUBSEQ VISIT: HCPCS | Performed by: NURSE PRACTITIONER

## 2024-09-25 PROCEDURE — 1036F TOBACCO NON-USER: CPT | Performed by: NURSE PRACTITIONER

## 2024-09-25 PROCEDURE — 1157F ADVNC CARE PLAN IN RCRD: CPT | Performed by: NURSE PRACTITIONER

## 2024-09-25 PROCEDURE — 93000 ELECTROCARDIOGRAM COMPLETE: CPT | Performed by: NURSE PRACTITIONER

## 2024-09-25 PROCEDURE — 1158F ADVNC CARE PLAN TLK DOCD: CPT | Performed by: NURSE PRACTITIONER

## 2024-09-25 ASSESSMENT — ENCOUNTER SYMPTOMS
NAUSEA: 0
VOMITING: 0
FATIGUE: 0
DIARRHEA: 0
WEAKNESS: 0
FEVER: 0
SHORTNESS OF BREATH: 0
SLEEP DISTURBANCE: 0
COUGH: 0
AGITATION: 0
CONSTIPATION: 0
NERVOUS/ANXIOUS: 0

## 2024-09-25 ASSESSMENT — PATIENT HEALTH QUESTIONNAIRE - PHQ9
1. LITTLE INTEREST OR PLEASURE IN DOING THINGS: NOT AT ALL
2. FEELING DOWN, DEPRESSED OR HOPELESS: NOT AT ALL
SUM OF ALL RESPONSES TO PHQ9 QUESTIONS 1 AND 2: 0

## 2024-09-25 ASSESSMENT — ACTIVITIES OF DAILY LIVING (ADL)
TAKING_MEDICATION: INDEPENDENT
DRESSING: INDEPENDENT
BATHING: INDEPENDENT
GROCERY_SHOPPING: INDEPENDENT
DOING_HOUSEWORK: INDEPENDENT
MANAGING_FINANCES: INDEPENDENT

## 2024-09-25 NOTE — PROGRESS NOTES
Subjective   Reason for Visit: Teri Sandhu is an 74 y.o. female here for a Medicare Wellness visit.          Reviewed all medications by prescribing practitioner or clinical pharmacist (such as prescriptions, OTCs, herbal therapies and supplements) and documented in the medical record.    HPI    Patient Care Team:  ROSANNA Ramirez-CNP as PCP - General (Internal Medicine)     Review of Systems    Objective   Vitals:  There were no vitals taken for this visit.      Physical Exam    Assessment & Plan

## 2024-09-25 NOTE — PROGRESS NOTES
"Subjective   Reason for Visit: Teri Sanduh is an 74 y.o. female here for a Medicare Wellness visit.          Reviewed all medications by prescribing practitioner or clinical pharmacist (such as prescriptions, OTCs, herbal therapies and supplements) and documented in the medical record.    Feeling well and no concerns        Patient Care Team:  LIZABETH Ramirez as PCP - General (Internal Medicine)     Review of Systems   Constitutional:  Negative for fatigue and fever.   Respiratory:  Negative for cough and shortness of breath.    Cardiovascular:  Negative for chest pain and leg swelling.   Gastrointestinal:  Negative for constipation, diarrhea, nausea and vomiting.   Skin:  Negative for pallor and rash.   Neurological:  Negative for weakness.   Psychiatric/Behavioral:  Negative for agitation, behavioral problems and sleep disturbance. The patient is not nervous/anxious.        Objective   Vitals:  /68   Pulse 70   Temp 36.5 °C (97.7 °F)   Ht 1.676 m (5' 6\")   Wt 71.8 kg (158 lb 6 oz)   BMI 25.56 kg/m²       Physical Exam  Vitals and nursing note reviewed.   Constitutional:       General: She is not in acute distress.  HENT:      Head: Normocephalic and atraumatic.      Right Ear: Tympanic membrane normal.      Left Ear: Tympanic membrane normal.   Cardiovascular:      Rate and Rhythm: Normal rate and regular rhythm.   Pulmonary:      Effort: Pulmonary effort is normal.      Breath sounds: Normal breath sounds.   Musculoskeletal:         General: Normal range of motion.      Right lower leg: No edema.      Left lower leg: No edema.   Skin:     General: Skin is warm and dry.   Neurological:      Mental Status: She is alert and oriented to person, place, and time.   Psychiatric:         Mood and Affect: Mood normal.         Assessment & Plan  Routine general medical examination at health care facility    Orders:    1 Year Follow Up In [facility] - PCP - Wellness Exam; Future    TSH " with reflex to Free T4 if abnormal; Future    Hemoglobin A1C; Future    Comprehensive Metabolic Panel; Future    CBC; Future    ECG 12 lead (Clinic Performed)    Menopause present    Orders:    XR DEXA bone density; Future    Fatty liver    Orders:    TSH with reflex to Free T4 if abnormal; Future    Hemoglobin A1C; Future    Comprehensive Metabolic Panel; Future    CBC; Future    Hypertension, benign    Orders:    TSH with reflex to Free T4 if abnormal; Future    Hemoglobin A1C; Future    Comprehensive Metabolic Panel; Future    CBC; Future    ECG 12 lead (Clinic Performed)    Vitamin D 25-Hydroxy,Total (for eval of Vitamin D levels); Future    Elevated blood sugar    Orders:    Hemoglobin A1C; Future    PVC (premature ventricular contraction)    Orders:    ECG 12 lead (Clinic Performed)    Vitamin D deficiency    Orders:    Vitamin D 25-Hydroxy,Total (for eval of Vitamin D levels); Future

## 2024-09-25 NOTE — ASSESSMENT & PLAN NOTE
Orders:    TSH with reflex to Free T4 if abnormal; Future    Hemoglobin A1C; Future    Comprehensive Metabolic Panel; Future    CBC; Future

## 2024-09-25 NOTE — ASSESSMENT & PLAN NOTE
Orders:    TSH with reflex to Free T4 if abnormal; Future    Hemoglobin A1C; Future    Comprehensive Metabolic Panel; Future    CBC; Future    ECG 12 lead (Clinic Performed)    Vitamin D 25-Hydroxy,Total (for eval of Vitamin D levels); Future

## 2024-10-01 ENCOUNTER — LAB (OUTPATIENT)
Dept: LAB | Facility: LAB | Age: 74
End: 2024-10-01
Payer: MEDICARE

## 2024-10-01 ENCOUNTER — HOSPITAL ENCOUNTER (OUTPATIENT)
Dept: RADIOLOGY | Facility: CLINIC | Age: 74
Discharge: HOME | End: 2024-10-01
Payer: MEDICARE

## 2024-10-01 DIAGNOSIS — E55.9 VITAMIN D DEFICIENCY: ICD-10-CM

## 2024-10-01 DIAGNOSIS — I10 HYPERTENSION, BENIGN: ICD-10-CM

## 2024-10-01 DIAGNOSIS — K76.0 FATTY LIVER: ICD-10-CM

## 2024-10-01 DIAGNOSIS — Z78.0 MENOPAUSE PRESENT: ICD-10-CM

## 2024-10-01 DIAGNOSIS — R73.9 ELEVATED BLOOD SUGAR: ICD-10-CM

## 2024-10-01 DIAGNOSIS — Z00.00 ROUTINE GENERAL MEDICAL EXAMINATION AT HEALTH CARE FACILITY: ICD-10-CM

## 2024-10-01 LAB
25(OH)D3 SERPL-MCNC: 46 NG/ML (ref 30–100)
ALBUMIN SERPL BCP-MCNC: 3.9 G/DL (ref 3.4–5)
ALP SERPL-CCNC: 82 U/L (ref 33–136)
ALT SERPL W P-5'-P-CCNC: 10 U/L (ref 7–45)
ANION GAP SERPL CALC-SCNC: 8 MMOL/L (ref 10–20)
AST SERPL W P-5'-P-CCNC: 13 U/L (ref 9–39)
BILIRUB SERPL-MCNC: 1.1 MG/DL (ref 0–1.2)
BUN SERPL-MCNC: 14 MG/DL (ref 6–23)
CALCIUM SERPL-MCNC: 9.3 MG/DL (ref 8.6–10.3)
CHLORIDE SERPL-SCNC: 102 MMOL/L (ref 98–107)
CO2 SERPL-SCNC: 31 MMOL/L (ref 21–32)
CREAT SERPL-MCNC: 0.82 MG/DL (ref 0.5–1.05)
EGFRCR SERPLBLD CKD-EPI 2021: 74 ML/MIN/1.73M*2
ERYTHROCYTE [DISTWIDTH] IN BLOOD BY AUTOMATED COUNT: 13.2 % (ref 11.5–14.5)
EST. AVERAGE GLUCOSE BLD GHB EST-MCNC: 114 MG/DL
GLUCOSE SERPL-MCNC: 117 MG/DL (ref 74–99)
HBA1C MFR BLD: 5.6 %
HCT VFR BLD AUTO: 42 % (ref 36–46)
HGB BLD-MCNC: 13.9 G/DL (ref 12–16)
MCH RBC QN AUTO: 28.7 PG (ref 26–34)
MCHC RBC AUTO-ENTMCNC: 33.1 G/DL (ref 32–36)
MCV RBC AUTO: 87 FL (ref 80–100)
NRBC BLD-RTO: 0 /100 WBCS (ref 0–0)
PLATELET # BLD AUTO: 193 X10*3/UL (ref 150–450)
POTASSIUM SERPL-SCNC: 3.9 MMOL/L (ref 3.5–5.3)
PROT SERPL-MCNC: 7.9 G/DL (ref 6.4–8.2)
RBC # BLD AUTO: 4.84 X10*6/UL (ref 4–5.2)
SODIUM SERPL-SCNC: 137 MMOL/L (ref 136–145)
TSH SERPL-ACNC: 1.07 MIU/L (ref 0.44–3.98)
WBC # BLD AUTO: 8.8 X10*3/UL (ref 4.4–11.3)

## 2024-10-01 PROCEDURE — 80053 COMPREHEN METABOLIC PANEL: CPT

## 2024-10-01 PROCEDURE — 85027 COMPLETE CBC AUTOMATED: CPT

## 2024-10-01 PROCEDURE — 77080 DXA BONE DENSITY AXIAL: CPT | Performed by: RADIOLOGY

## 2024-10-01 PROCEDURE — 84443 ASSAY THYROID STIM HORMONE: CPT

## 2024-10-01 PROCEDURE — 83036 HEMOGLOBIN GLYCOSYLATED A1C: CPT

## 2024-10-01 PROCEDURE — 36415 COLL VENOUS BLD VENIPUNCTURE: CPT

## 2024-10-01 PROCEDURE — 82306 VITAMIN D 25 HYDROXY: CPT

## 2024-10-01 PROCEDURE — 77080 DXA BONE DENSITY AXIAL: CPT

## 2024-10-01 NOTE — RESULT ENCOUNTER NOTE
Dexa scan shows low bone mass, if not already taking please start over the counter Calcium with Vitamin D 1200mg/800mg daily

## 2024-10-07 ENCOUNTER — OFFICE VISIT (OUTPATIENT)
Dept: GASTROENTEROLOGY | Facility: CLINIC | Age: 74
End: 2024-10-07
Payer: MEDICARE

## 2024-10-07 VITALS
DIASTOLIC BLOOD PRESSURE: 78 MMHG | SYSTOLIC BLOOD PRESSURE: 121 MMHG | HEIGHT: 66 IN | WEIGHT: 158 LBS | TEMPERATURE: 97.2 F | HEART RATE: 70 BPM | BODY MASS INDEX: 25.39 KG/M2

## 2024-10-07 DIAGNOSIS — K76.0 FATTY LIVER: Primary | ICD-10-CM

## 2024-10-07 PROCEDURE — 1123F ACP DISCUSS/DSCN MKR DOCD: CPT | Performed by: NURSE PRACTITIONER

## 2024-10-07 PROCEDURE — 99212 OFFICE O/P EST SF 10 MIN: CPT | Performed by: NURSE PRACTITIONER

## 2024-10-07 PROCEDURE — 1036F TOBACCO NON-USER: CPT | Performed by: NURSE PRACTITIONER

## 2024-10-07 PROCEDURE — 3074F SYST BP LT 130 MM HG: CPT | Performed by: NURSE PRACTITIONER

## 2024-10-07 PROCEDURE — 3078F DIAST BP <80 MM HG: CPT | Performed by: NURSE PRACTITIONER

## 2024-10-07 PROCEDURE — 1157F ADVNC CARE PLAN IN RCRD: CPT | Performed by: NURSE PRACTITIONER

## 2024-10-07 PROCEDURE — 1159F MED LIST DOCD IN RCRD: CPT | Performed by: NURSE PRACTITIONER

## 2024-10-07 ASSESSMENT — ENCOUNTER SYMPTOMS
MUSCULOSKELETAL NEGATIVE: 1
DIFFICULTY URINATING: 0
PSYCHIATRIC NEGATIVE: 1
ROS GI COMMENTS: SEE HPI
NEUROLOGICAL NEGATIVE: 1
STRIDOR: 0
APNEA: 0
HEMATOLOGIC/LYMPHATIC NEGATIVE: 1
DIAPHORESIS: 0
ALLERGIC/IMMUNOLOGIC NEGATIVE: 1
CHILLS: 0
RESPIRATORY NEGATIVE: 1
CHEST TIGHTNESS: 0
EYES NEGATIVE: 1
COUGH: 0
WHEEZING: 0
SHORTNESS OF BREATH: 0
FATIGUE: 0
FEVER: 0
ENDOCRINE NEGATIVE: 1
CARDIOVASCULAR NEGATIVE: 1

## 2024-10-07 NOTE — PROGRESS NOTES
Subjective   Patient ID: Teri Sandhu is a 74 y.o. female who presents for No chief complaint on file..  HPI    Review of Systems    Objective   Physical Exam    Assessment/Plan            LIZABETH Andrade 10/07/24 10:18 AM

## 2024-10-07 NOTE — PATIENT INSTRUCTIONS
PATIENT EDUCATION:    Teaching given on NAFLD (fatty liver), how common it is, what it is, and the risk factors for NAFLD.   Lifestyle changes can help prevent damage and may reverse it in the early stages.  First line of treatment for overweight or obese person is weight loss, that is gradual and sustained.  Recommendation is 5-10% over 6-12 months. Physical activity is important in treating fatty liver. Further recommendation is regular exercise that includes aerobic and resistance.  Most effective treatment is exercise that includes resistance, (weight training/gym training) to help mobilize fat out of the liver.  Good nutrition that includes low sugar fresh fruits and low starch vegetables, and using healthy oils/fats for cooking.  Lower carb diet by decreasing intake of added sugar, sugary beverages, high fructose corn syrup, and as much as possible avoiding foods made with white flour.  Should limit alcohol intake in NAFLD.  Keeping cholesterol and blood sugar under control in those with diabetes and high cholesterol.  Eating healthy and exercising regularly may help prevent liver damage from starting or reverse it in the early stages.    What are Carbohydrates?  Come in a variety of forms, most common are sugar, fiber, and starches  Healthy carbohydrates: unprocessed or minimally processed foods, vegetables, some fruits, and beans  Unhealthy carbohydrates: sugar, white bread, pastries, soda, highly processed or refined foods.     Foods High in Carbohydrates  High starch foods  Grains (rice ,wheat, barley)  Corn  Bread, pasta, cereal  Potatoes and root vegetables  Soda  Chips    Fruits High in Sugar  Mangoes  Grapes  Cherries  Pears  Figs  Watermelon   Bananas  Candied fruit  Fruit juices  Canned Fruit  Dried fruits

## 2024-10-07 NOTE — PROGRESS NOTES
Subjective   Patient ID: Teri Sandhu is a 74 y.o. female who presents for Non-alcoholic Fatty Liver Disease and Lactose Intolerance.  Presents today for follow-up    Doing well-no longer having diarrhea.     CT negative    Colonoscopy in 2020- TA repeat in 5 years     Denies a family hx of CRC, GI cancers         Review of Systems   Constitutional:  Negative for chills, diaphoresis, fatigue and fever.   HENT: Negative.     Eyes: Negative.    Respiratory: Negative.  Negative for apnea, cough, chest tightness, shortness of breath, wheezing and stridor.    Cardiovascular: Negative.    Gastrointestinal:         See HPI    Endocrine: Negative.    Genitourinary: Negative.  Negative for difficulty urinating.   Musculoskeletal: Negative.    Skin: Negative.    Allergic/Immunologic: Negative.    Neurological: Negative.    Hematological: Negative.    Psychiatric/Behavioral: Negative.         Objective   Physical Exam  Constitutional:       Appearance: She is normal weight.   Neurological:      Mental Status: She is alert.   Psychiatric:         Mood and Affect: Mood normal.         Assessment/Plan   There are no diagnoses linked to this encounter.         ROSANNA Andrade-CNP 10/07/24 2:49 PM      73 year old female with a PMH of GERD. Last seen in GI in 2023 for diarrhea which has resolved with a lactose free diet. She has been doing well and presents today to follow-up on fatty liver. Fibroscan ordered. Last fibroscan found F2.

## 2024-10-08 ENCOUNTER — APPOINTMENT (OUTPATIENT)
Dept: GASTROENTEROLOGY | Facility: CLINIC | Age: 74
End: 2024-10-08
Payer: MEDICARE

## 2024-10-09 ENCOUNTER — CLINICAL SUPPORT (OUTPATIENT)
Dept: GASTROENTEROLOGY | Facility: CLINIC | Age: 74
End: 2024-10-09
Payer: MEDICARE

## 2024-10-09 DIAGNOSIS — K76.0 FATTY LIVER: ICD-10-CM

## 2024-10-17 ENCOUNTER — TELEPHONE (OUTPATIENT)
Dept: GASTROENTEROLOGY | Facility: CLINIC | Age: 74
End: 2024-10-17
Payer: MEDICARE

## 2024-10-17 NOTE — TELEPHONE ENCOUNTER
Result Communication    Resulted Orders   Liver Elastography (Fibroscan) GI    Narrative    Impression  Overall Impression:     Interpretation:  This was a technically adequate study. The Fibrosis score is consistent   with Metavir F0 . The CAP score is consistent with 0 - 10% hepatocyte   steatosis (steatosis stage 0 of 3) .    Cole Goddard MD  Hepatology    Recommendation  Follow up with your gastroenterology provider, Bhumika BARONE.    Indication  Fatty liver    Preprocedure  A history and physical has been performed by the referring clinician.    Details of the Procedure  Refer to the scanned FibroScan report.    Findings    Results:  E (median liver stiffness measurement):  4.9  kPa  CAP (controlled attenuation parameter):  279  dB/m    Specimens  None         10:57 AM      Results were successfully communicated with the patient and they did not acknowledge their understanding. Follow-up in 1 year

## 2024-12-05 ENCOUNTER — TELEPHONE (OUTPATIENT)
Dept: GASTROENTEROLOGY | Facility: CLINIC | Age: 74
End: 2024-12-05
Payer: MEDICARE

## 2024-12-23 ENCOUNTER — APPOINTMENT (OUTPATIENT)
Dept: GASTROENTEROLOGY | Facility: CLINIC | Age: 74
End: 2024-12-23
Payer: MEDICARE

## 2025-08-11 DIAGNOSIS — I10 HYPERTENSION, BENIGN: ICD-10-CM

## 2025-08-11 DIAGNOSIS — R32 URINARY INCONTINENCE, UNSPECIFIED TYPE: ICD-10-CM

## 2025-08-11 RX ORDER — AMLODIPINE BESYLATE 2.5 MG/1
2.5 TABLET ORAL DAILY
Qty: 30 TABLET | Refills: 0 | Status: SHIPPED | OUTPATIENT
Start: 2025-08-11 | End: 2025-08-11 | Stop reason: SDUPTHER

## 2025-08-11 RX ORDER — OXYBUTYNIN CHLORIDE 10 MG/1
10 TABLET, EXTENDED RELEASE ORAL DAILY
Qty: 90 TABLET | Refills: 3 | Status: SHIPPED | OUTPATIENT
Start: 2025-08-11

## 2025-08-11 RX ORDER — LOSARTAN POTASSIUM 100 MG/1
100 TABLET ORAL DAILY
Qty: 90 TABLET | Refills: 3 | Status: SHIPPED | OUTPATIENT
Start: 2025-08-11

## 2025-08-11 RX ORDER — HYDROCHLOROTHIAZIDE 12.5 MG/1
12.5 TABLET ORAL DAILY
Qty: 90 TABLET | Refills: 3 | Status: SHIPPED | OUTPATIENT
Start: 2025-08-11

## 2025-08-11 RX ORDER — CARVEDILOL 25 MG/1
25 TABLET ORAL
Qty: 180 TABLET | Refills: 3 | Status: SHIPPED | OUTPATIENT
Start: 2025-08-11

## 2025-08-11 RX ORDER — AMLODIPINE BESYLATE 2.5 MG/1
2.5 TABLET ORAL DAILY
Qty: 90 TABLET | Refills: 3 | Status: SHIPPED | OUTPATIENT
Start: 2025-08-11

## 2025-09-25 ENCOUNTER — APPOINTMENT (OUTPATIENT)
Dept: PRIMARY CARE | Facility: CLINIC | Age: 75
End: 2025-09-25
Payer: MEDICARE